# Patient Record
Sex: FEMALE | Race: WHITE | NOT HISPANIC OR LATINO | Employment: OTHER | URBAN - METROPOLITAN AREA
[De-identification: names, ages, dates, MRNs, and addresses within clinical notes are randomized per-mention and may not be internally consistent; named-entity substitution may affect disease eponyms.]

---

## 2021-06-14 NOTE — PROGRESS NOTES
[facility]   Consult  Deon Robins  1950    VISIT DATE:  06/15/21    PCP:   Nish Rodriguez MD  [address] [place] [address]        CC:  Atrial Fibrillation      Problem List:  1.  Atrial fibrillation  2.  HTN  3.  C 2006 normal per patient    History of Present Illness:  Deon Robins  Is a 71 y.o. female with pertinent cardiac history detailed above.  The patient was seen in the ER [facility] last week with RAKAN garcia with RVR. Symptoms were lightheadedness, sweaty, palpitations.   Responded to diltiazem IV.  She is in atrial fibrillation today.  Her home medications include sotalol 120 mg twice daily and Xarelto for anticoagulation  She had seen cardiology in January, planned to have an echo but was not done.  Last ECV was 3 years ago. she saw  [facility]  Dr. Dixon, Dr. Abraham.   She thinks she went back into afib last week.  She thinks she done well maintaining sinus rhythm since cardioversion 3 years ago.  She states she takes her Xarelto regularly.  She denies any chest pain or pressure outside of when she is in atrial fibrillation.  Her blood pressure is controlled.    There are no problems to display for this patient.      No Known Allergies    Social History     Socioeconomic History   • Marital status:      Spouse name: Not on file   • Number of children: Not on file   • Years of education: Not on file   • Highest education level: Not on file   Tobacco Use   • Smoking status: Never Smoker   • Smokeless tobacco: Never Used   Substance and Sexual Activity   • Alcohol use: Not Currently   • Drug use: Never   • Sexual activity: Defer       History reviewed. No pertinent family history.    Current Medications:    Current Outpatient Medications:   •  amLODIPine (NORVASC) 5 MG tablet, Take 5 mg by mouth Daily., Disp: , Rfl:   •  losartan (COZAAR) 25 MG tablet, Take 25 mg by mouth Daily., Disp: , Rfl:   •  multivitamin (MULTI-VITAMIN  "PO), Take 1 tablet by mouth Daily., Disp: , Rfl:   •  sotalol (BETAPACE) 120 MG tablet, Take 120 mg by mouth 2 (two) times a day., Disp: , Rfl:   •  VITAMIN D PO, Take 1 tablet by mouth 2 (two) times a day., Disp: , Rfl:   •  Xarelto 20 MG tablet, Take 20 mg by mouth Daily., Disp: , Rfl:      Review of Systems   Constitutional: Positive for diaphoresis and malaise/fatigue.   Cardiovascular: Positive for irregular heartbeat and palpitations. Negative for chest pain and syncope.   Neurological: Positive for light-headedness.       Vitals:    06/15/21 1029   BP: 122/66   BP Location: Left arm   Patient Position: Sitting   Pulse: 78   SpO2: 97%   Weight: 84.8 kg (187 lb)   Height: 171.5 cm (67.5\")       Physical Exam  Constitutional:       Appearance: Normal appearance.   Cardiovascular:      Rate and Rhythm: Tachycardia present. Rhythm irregular.      Pulses: Normal pulses.      Heart sounds: Normal heart sounds.   Pulmonary:      Effort: Pulmonary effort is normal.      Breath sounds: Normal breath sounds.   Abdominal:      Palpations: Abdomen is soft.   Musculoskeletal:      Right lower leg: No edema.      Left lower leg: No edema.   Neurological:      Mental Status: She is alert. Mental status is at baseline.   Psychiatric:         Mood and Affect: Mood normal.         Diagnostic Data:    ECG 12 Lead    Date/Time: 6/15/2021 10:48 AM  Performed by: Timbo Munguia MD  Authorized by: Timbo Munguia MD   Comparison: compared with previous ECG from 6/10/2021  Similar to previous ECG  Rhythm: atrial fibrillation  Rate: tachycardic  BPM: 104  QRS axis: left    Clinical impression: abnormal EKG  Clinical impression comment: .            No results found for: CHLPL, TRIG, HDL, LDLDIRECT  No results found for: GLUCOSE, BUN, CREATININE, NA, K, CL, CO2, CREATININE, BUN  No results found for: HGBA1C  No results found for: WBC, HGB, HCT, PLT    Assessment:   Diagnosis Plan   1. Atrial fibrillation, " unspecified type (CMS/HCC)  ECG 12 Lead    Adult Transthoracic Echo Complete W/ Cont if Necessary Per Protocol    Cardioversion External in Cardiology Department   2. Abnormal electrocardiogram (ECG) (EKG)   Adult Transthoracic Echo Complete W/ Cont if Necessary Per Protocol    Cardioversion External in Cardiology Department       Plan:      1.  Atrial fibrillation  -Reports she typically has done well maintaining sinus rhythm.  Last cardioversion was about 3 years ago  -schedule repeat ECV as she is having persistent symptomatic atrial fibrillation, although it is generally rate controlled  -reports compliance with xarelto  -We will obtain an echocardiogram to evaluate underlying LV systolic function.    2.  Hypertension  -on amlodipine, losartan, controlled    Follow-up at time of procedure.  Schedule follow-up in the office in [facility] in 3 months            Timbo Munguia MD [facility]

## 2021-06-14 NOTE — H&P (VIEW-ONLY)
[facility]   Consult  Deon Robins  1950    VISIT DATE:  06/15/21    PCP:   Nish Rodriguez MD  [address] [place] [address]        CC:  Atrial Fibrillation      Problem List:  1.  Atrial fibrillation  2.  HTN  3.  C 2006 normal per patient    History of Present Illness:  Deon Robins  Is a 71 y.o. female with pertinent cardiac history detailed above.  The patient was seen in the ER [facility] last week with RAKAN garcia with RVR. Symptoms were lightheadedness, sweaty, palpitations.   Responded to diltiazem IV.  She is in atrial fibrillation today.  Her home medications include sotalol 120 mg twice daily and Xarelto for anticoagulation  She had seen cardiology in January, planned to have an echo but was not done.  Last ECV was 3 years ago. she saw  [facility]  Dr. Dixon, Dr. Abraham.   She thinks she went back into afib last week.  She thinks she done well maintaining sinus rhythm since cardioversion 3 years ago.  She states she takes her Xarelto regularly.  She denies any chest pain or pressure outside of when she is in atrial fibrillation.  Her blood pressure is controlled.    There are no problems to display for this patient.      No Known Allergies    Social History     Socioeconomic History   • Marital status:      Spouse name: Not on file   • Number of children: Not on file   • Years of education: Not on file   • Highest education level: Not on file   Tobacco Use   • Smoking status: Never Smoker   • Smokeless tobacco: Never Used   Substance and Sexual Activity   • Alcohol use: Not Currently   • Drug use: Never   • Sexual activity: Defer       History reviewed. No pertinent family history.    Current Medications:    Current Outpatient Medications:   •  amLODIPine (NORVASC) 5 MG tablet, Take 5 mg by mouth Daily., Disp: , Rfl:   •  losartan (COZAAR) 25 MG tablet, Take 25 mg by mouth Daily., Disp: , Rfl:   •  multivitamin (MULTI-VITAMIN  "PO), Take 1 tablet by mouth Daily., Disp: , Rfl:   •  sotalol (BETAPACE) 120 MG tablet, Take 120 mg by mouth 2 (two) times a day., Disp: , Rfl:   •  VITAMIN D PO, Take 1 tablet by mouth 2 (two) times a day., Disp: , Rfl:   •  Xarelto 20 MG tablet, Take 20 mg by mouth Daily., Disp: , Rfl:      Review of Systems   Constitutional: Positive for diaphoresis and malaise/fatigue.   Cardiovascular: Positive for irregular heartbeat and palpitations. Negative for chest pain and syncope.   Neurological: Positive for light-headedness.       Vitals:    06/15/21 1029   BP: 122/66   BP Location: Left arm   Patient Position: Sitting   Pulse: 78   SpO2: 97%   Weight: 84.8 kg (187 lb)   Height: 171.5 cm (67.5\")       Physical Exam  Constitutional:       Appearance: Normal appearance.   Cardiovascular:      Rate and Rhythm: Tachycardia present. Rhythm irregular.      Pulses: Normal pulses.      Heart sounds: Normal heart sounds.   Pulmonary:      Effort: Pulmonary effort is normal.      Breath sounds: Normal breath sounds.   Abdominal:      Palpations: Abdomen is soft.   Musculoskeletal:      Right lower leg: No edema.      Left lower leg: No edema.   Neurological:      Mental Status: She is alert. Mental status is at baseline.   Psychiatric:         Mood and Affect: Mood normal.         Diagnostic Data:    ECG 12 Lead    Date/Time: 6/15/2021 10:48 AM  Performed by: Timbo Munguia MD  Authorized by: Timbo Munguia MD   Comparison: compared with previous ECG from 6/10/2021  Similar to previous ECG  Rhythm: atrial fibrillation  Rate: tachycardic  BPM: 104  QRS axis: left    Clinical impression: abnormal EKG  Clinical impression comment: .            No results found for: CHLPL, TRIG, HDL, LDLDIRECT  No results found for: GLUCOSE, BUN, CREATININE, NA, K, CL, CO2, CREATININE, BUN  No results found for: HGBA1C  No results found for: WBC, HGB, HCT, PLT    Assessment:   Diagnosis Plan   1. Atrial fibrillation, " unspecified type (CMS/HCC)  ECG 12 Lead    Adult Transthoracic Echo Complete W/ Cont if Necessary Per Protocol    Cardioversion External in Cardiology Department   2. Abnormal electrocardiogram (ECG) (EKG)   Adult Transthoracic Echo Complete W/ Cont if Necessary Per Protocol    Cardioversion External in Cardiology Department       Plan:      1.  Atrial fibrillation  -Reports she typically has done well maintaining sinus rhythm.  Last cardioversion was about 3 years ago  -schedule repeat ECV as she is having persistent symptomatic atrial fibrillation, although it is generally rate controlled  -reports compliance with xarelto  -We will obtain an echocardiogram to evaluate underlying LV systolic function.    2.  Hypertension  -on amlodipine, losartan, controlled    Follow-up at time of procedure.  Schedule follow-up in the office in [facility] in 3 months            Timbo Munguia MD [facility]

## 2021-06-15 ENCOUNTER — OFFICE VISIT (OUTPATIENT)
Dept: CARDIOLOGY | Facility: CLINIC | Age: 71
End: 2021-06-15

## 2021-06-15 VITALS
WEIGHT: 187 LBS | HEIGHT: 68 IN | HEART RATE: 78 BPM | BODY MASS INDEX: 28.34 KG/M2 | DIASTOLIC BLOOD PRESSURE: 66 MMHG | SYSTOLIC BLOOD PRESSURE: 122 MMHG | OXYGEN SATURATION: 97 %

## 2021-06-15 DIAGNOSIS — R94.31 ABNORMAL ELECTROCARDIOGRAM (ECG) (EKG): ICD-10-CM

## 2021-06-15 DIAGNOSIS — I48.91 ATRIAL FIBRILLATION, UNSPECIFIED TYPE (HCC): Primary | ICD-10-CM

## 2021-06-15 PROCEDURE — 93000 ELECTROCARDIOGRAM COMPLETE: CPT | Performed by: INTERNAL MEDICINE

## 2021-06-15 PROCEDURE — 99204 OFFICE O/P NEW MOD 45 MIN: CPT | Performed by: INTERNAL MEDICINE

## 2021-06-15 RX ORDER — RIVAROXABAN 20 MG/1
20 TABLET, FILM COATED ORAL DAILY
COMMUNITY
Start: 2021-04-21 | End: 2021-07-19 | Stop reason: SDUPTHER

## 2021-06-15 RX ORDER — DIPHENOXYLATE HYDROCHLORIDE AND ATROPINE SULFATE 2.5; .025 MG/1; MG/1
1 TABLET ORAL DAILY
COMMUNITY

## 2021-06-15 RX ORDER — LOSARTAN POTASSIUM 25 MG/1
25 TABLET ORAL DAILY
COMMUNITY
Start: 2021-04-13 | End: 2021-11-04 | Stop reason: SDUPTHER

## 2021-06-15 RX ORDER — AMLODIPINE BESYLATE 5 MG/1
5 TABLET ORAL DAILY
COMMUNITY
Start: 2021-06-07 | End: 2022-08-16 | Stop reason: SDUPTHER

## 2021-06-15 RX ORDER — SOTALOL HYDROCHLORIDE 120 MG/1
120 TABLET ORAL 2 TIMES DAILY
COMMUNITY
Start: 2021-05-17 | End: 2022-02-17 | Stop reason: SDUPTHER

## 2021-06-21 ENCOUNTER — HOSPITAL ENCOUNTER (OUTPATIENT)
Dept: CARDIOLOGY | Facility: HOSPITAL | Age: 71
Discharge: HOME | End: 2021-06-21
Attending: INTERNAL MEDICINE | Admitting: INTERNAL MEDICINE

## 2021-06-21 ENCOUNTER — PREP FOR SURGERY (OUTPATIENT)
Dept: OTHER | Facility: HOSPITAL | Age: 71
End: 2021-06-21

## 2021-06-21 ENCOUNTER — APPOINTMENT (OUTPATIENT)
Dept: CARDIOLOGY | Facility: HOSPITAL | Age: 71
End: 2021-06-21

## 2021-06-21 VITALS
OXYGEN SATURATION: 99 % | RESPIRATION RATE: 16 BRPM | BODY MASS INDEX: 29.35 KG/M2 | TEMPERATURE: 97.8 F | WEIGHT: 187 LBS | HEIGHT: 67 IN | DIASTOLIC BLOOD PRESSURE: 59 MMHG | HEART RATE: 49 BPM | SYSTOLIC BLOOD PRESSURE: 95 MMHG

## 2021-06-21 DIAGNOSIS — I48.19 ATRIAL FIBRILLATION, PERSISTENT (HCC): Primary | ICD-10-CM

## 2021-06-21 DIAGNOSIS — R94.31 ABNORMAL ELECTROCARDIOGRAM (ECG) (EKG): ICD-10-CM

## 2021-06-21 DIAGNOSIS — I48.91 ATRIAL FIBRILLATION, UNSPECIFIED TYPE (HCC): ICD-10-CM

## 2021-06-21 LAB
ANION GAP SERPL CALCULATED.3IONS-SCNC: 13 MMOL/L (ref 5–15)
BH CV ECHO MEAS - AO ROOT AREA (BSA CORRECTED): 1.9
BH CV ECHO MEAS - AO ROOT AREA: 10.5 CM^2
BH CV ECHO MEAS - AO ROOT DIAM: 3.7 CM
BH CV ECHO MEAS - BSA(HAYCOCK): 2 M^2
BH CV ECHO MEAS - BSA: 2 M^2
BH CV ECHO MEAS - BZI_BMI: 29.4 KILOGRAMS/M^2
BH CV ECHO MEAS - BZI_METRIC_HEIGHT: 170 CM
BH CV ECHO MEAS - BZI_METRIC_WEIGHT: 84.8 KG
BH CV ECHO MEAS - EDV(CUBED): 62.5 ML
BH CV ECHO MEAS - EDV(MOD-SP2): 71.5 ML
BH CV ECHO MEAS - EDV(MOD-SP4): 99.8 ML
BH CV ECHO MEAS - EDV(TEICH): 68.7 ML
BH CV ECHO MEAS - EF(CUBED): 68 %
BH CV ECHO MEAS - EF(MOD-SP2): 47.3 %
BH CV ECHO MEAS - EF(MOD-SP4): 42.5 %
BH CV ECHO MEAS - EF(TEICH): 60.1 %
BH CV ECHO MEAS - ESV(CUBED): 20 ML
BH CV ECHO MEAS - ESV(MOD-SP2): 37.7 ML
BH CV ECHO MEAS - ESV(MOD-SP4): 57.4 ML
BH CV ECHO MEAS - ESV(TEICH): 27.4 ML
BH CV ECHO MEAS - FS: 31.6 %
BH CV ECHO MEAS - IVS/LVPW: 0.94
BH CV ECHO MEAS - IVSD: 0.98 CM
BH CV ECHO MEAS - LA DIMENSION: 2.9 CM
BH CV ECHO MEAS - LA/AO: 0.8
BH CV ECHO MEAS - LAD MAJOR: 5.2 CM
BH CV ECHO MEAS - LAT PEAK E' VEL: 3.4 CM/SEC
BH CV ECHO MEAS - LATERAL E/E' RATIO: 24.9
BH CV ECHO MEAS - LV DIASTOLIC VOL/BSA (35-75): 50.8 ML/M^2
BH CV ECHO MEAS - LV MASS(C)D: 128.6 GRAMS
BH CV ECHO MEAS - LV MASS(C)DI: 65.5 GRAMS/M^2
BH CV ECHO MEAS - LV MAX PG: 2.1 MMHG
BH CV ECHO MEAS - LV MEAN PG: 0.92 MMHG
BH CV ECHO MEAS - LV SYSTOLIC VOL/BSA (12-30): 29.2 ML/M^2
BH CV ECHO MEAS - LV V1 MAX: 73.3 CM/SEC
BH CV ECHO MEAS - LV V1 MEAN: 42.8 CM/SEC
BH CV ECHO MEAS - LV V1 VTI: 15.5 CM
BH CV ECHO MEAS - LVIDD: 4 CM
BH CV ECHO MEAS - LVIDS: 2.7 CM
BH CV ECHO MEAS - LVLD AP2: 7.9 CM
BH CV ECHO MEAS - LVLD AP4: 7.4 CM
BH CV ECHO MEAS - LVLS AP2: 7 CM
BH CV ECHO MEAS - LVLS AP4: 6.8 CM
BH CV ECHO MEAS - LVOT AREA (M): 3.1 CM^2
BH CV ECHO MEAS - LVOT AREA: 3.2 CM^2
BH CV ECHO MEAS - LVOT DIAM: 2 CM
BH CV ECHO MEAS - LVPWD: 1.1 CM
BH CV ECHO MEAS - MED PEAK E' VEL: 8.2 CM/SEC
BH CV ECHO MEAS - MEDIAL E/E' RATIO: 10.3
BH CV ECHO MEAS - MV DEC SLOPE: 687.1 CM/SEC^2
BH CV ECHO MEAS - MV E MAX VEL: 84 CM/SEC
BH CV ECHO MEAS - MV MAX PG: 5.6 MMHG
BH CV ECHO MEAS - MV MEAN PG: 2.2 MMHG
BH CV ECHO MEAS - MV P1/2T MAX VEL: 76.2 CM/SEC
BH CV ECHO MEAS - MV P1/2T: 32.5 MSEC
BH CV ECHO MEAS - MV V2 MAX: 118.1 CM/SEC
BH CV ECHO MEAS - MV V2 MEAN: 69.5 CM/SEC
BH CV ECHO MEAS - MV V2 VTI: 17.4 CM
BH CV ECHO MEAS - MVA P1/2T LCG: 2.9 CM^2
BH CV ECHO MEAS - MVA(P1/2T): 6.8 CM^2
BH CV ECHO MEAS - MVA(VTI): 2.8 CM^2
BH CV ECHO MEAS - PA ACC TIME: 0.09 SEC
BH CV ECHO MEAS - PA PR(ACCEL): 37.4 MMHG
BH CV ECHO MEAS - SI(CUBED): 21.6 ML/M^2
BH CV ECHO MEAS - SI(LVOT): 25.1 ML/M^2
BH CV ECHO MEAS - SI(MOD-SP2): 17.2 ML/M^2
BH CV ECHO MEAS - SI(MOD-SP4): 21.6 ML/M^2
BH CV ECHO MEAS - SI(TEICH): 21 ML/M^2
BH CV ECHO MEAS - SV(CUBED): 42.4 ML
BH CV ECHO MEAS - SV(LVOT): 49.2 ML
BH CV ECHO MEAS - SV(MOD-SP2): 33.8 ML
BH CV ECHO MEAS - SV(MOD-SP4): 42.4 ML
BH CV ECHO MEAS - SV(TEICH): 41.3 ML
BH CV ECHO MEAS - TAPSE (>1.6): 1.5 CM
BH CV ECHO MEASUREMENTS AVERAGE E/E' RATIO: 14.48
BH CV VAS BP LEFT ARM: NORMAL MMHG
BH CV XLRA - RV BASE: 4.6 CM
BH CV XLRA - RV LENGTH: 5.6 CM
BH CV XLRA - RV MID: 3.4 CM
BH CV XLRA - TDI S': 12.9 CM/SEC
BUN SERPL-MCNC: 13 MG/DL (ref 8–23)
BUN/CREAT SERPL: 18.6 (ref 7–25)
CALCIUM SPEC-SCNC: 9.9 MG/DL (ref 8.6–10.5)
CHLORIDE SERPL-SCNC: 107 MMOL/L (ref 98–107)
CO2 SERPL-SCNC: 21 MMOL/L (ref 22–29)
CREAT SERPL-MCNC: 0.7 MG/DL (ref 0.57–1)
DEPRECATED RDW RBC AUTO: 43.5 FL (ref 37–54)
ERYTHROCYTE [DISTWIDTH] IN BLOOD BY AUTOMATED COUNT: 13.2 % (ref 12.3–15.4)
GFR SERPL CREATININE-BSD FRML MDRD: 83 ML/MIN/1.73
GLUCOSE SERPL-MCNC: 104 MG/DL (ref 65–99)
HCT VFR BLD AUTO: 42.8 % (ref 34–46.6)
HGB BLD-MCNC: 14.1 G/DL (ref 12–15.9)
LEFT ATRIUM VOLUME INDEX: 27.8 ML/M^2
LEFT ATRIUM VOLUME: 54.6 ML
LV EF 2D ECHO EST: 50 %
MAXIMAL PREDICTED HEART RATE: 150 BPM
MAXIMAL PREDICTED HEART RATE: 150 BPM
MCH RBC QN AUTO: 29.4 PG (ref 26.6–33)
MCHC RBC AUTO-ENTMCNC: 32.9 G/DL (ref 31.5–35.7)
MCV RBC AUTO: 89.2 FL (ref 79–97)
PLATELET # BLD AUTO: 272 10*3/MM3 (ref 140–450)
PMV BLD AUTO: 10.6 FL (ref 6–12)
POTASSIUM SERPL-SCNC: 3.7 MMOL/L (ref 3.5–5.2)
RBC # BLD AUTO: 4.8 10*6/MM3 (ref 3.77–5.28)
SODIUM SERPL-SCNC: 141 MMOL/L (ref 136–145)
STRESS TARGET HR: 128 BPM
STRESS TARGET HR: 128 BPM
WBC # BLD AUTO: 8.34 10*3/MM3 (ref 3.4–10.8)

## 2021-06-21 PROCEDURE — 93005 ELECTROCARDIOGRAM TRACING: CPT | Performed by: INTERNAL MEDICINE

## 2021-06-21 PROCEDURE — 80048 BASIC METABOLIC PNL TOTAL CA: CPT

## 2021-06-21 PROCEDURE — 93306 TTE W/DOPPLER COMPLETE: CPT

## 2021-06-21 PROCEDURE — 92960 CARDIOVERSION ELECTRIC EXT: CPT

## 2021-06-21 PROCEDURE — 93306 TTE W/DOPPLER COMPLETE: CPT | Performed by: INTERNAL MEDICINE

## 2021-06-21 PROCEDURE — 92960 CARDIOVERSION ELECTRIC EXT: CPT | Performed by: INTERNAL MEDICINE

## 2021-06-21 PROCEDURE — 85027 COMPLETE CBC AUTOMATED: CPT

## 2021-06-21 PROCEDURE — 36415 COLL VENOUS BLD VENIPUNCTURE: CPT

## 2021-06-21 PROCEDURE — 25010000002 MIDAZOLAM PER 1 MG: Performed by: INTERNAL MEDICINE

## 2021-06-21 RX ORDER — MIDAZOLAM HYDROCHLORIDE 1 MG/ML
INJECTION INTRAMUSCULAR; INTRAVENOUS
Status: DISCONTINUED
Start: 2021-06-21 | End: 2021-06-21 | Stop reason: HOSPADM

## 2021-06-21 RX ORDER — FLUMAZENIL 0.1 MG/ML
INJECTION INTRAVENOUS
Status: DISCONTINUED
Start: 2021-06-21 | End: 2021-06-21 | Stop reason: WASHOUT

## 2021-06-21 RX ORDER — NALOXONE HYDROCHLORIDE 0.4 MG/ML
INJECTION, SOLUTION INTRAMUSCULAR; INTRAVENOUS; SUBCUTANEOUS
Status: DISCONTINUED
Start: 2021-06-21 | End: 2021-06-21 | Stop reason: WASHOUT

## 2021-06-21 RX ORDER — MIDAZOLAM HYDROCHLORIDE 1 MG/ML
INJECTION INTRAMUSCULAR; INTRAVENOUS
Status: COMPLETED | OUTPATIENT
Start: 2021-06-21 | End: 2021-06-21

## 2021-06-21 RX ADMIN — METHOHEXITAL SODIUM 30 MG: 500 INJECTION, POWDER, LYOPHILIZED, FOR SOLUTION INTRAMUSCULAR; INTRAVENOUS; RECTAL at 13:36

## 2021-06-21 RX ADMIN — MIDAZOLAM 2 MG: 1 INJECTION INTRAMUSCULAR; INTRAVENOUS at 13:36

## 2021-06-21 NOTE — INTERVAL H&P NOTE
The patient presents for cardioversion as detailed above.  She has been continuously compliant with Xarelto.  She has no additional complaints or concerns today.  She acknowledges the risks and benefits of the procedure and is agreeable to proceed.

## 2021-06-21 NOTE — PROCEDURES
Diagnosis:  Atrial fibrillation    PROCEDURE PERFORMED: External electrical cardioversion.    Anesthesia: Sedation with:  1. 2 mg Versed  2. 30 mg Brevital    Estimated Blood Loss: Less than 10 mL     Specimens: None    PROCEDURE IN DETAIL: The patient was brought into the [facility] in a fasting  state. The patient was given moderate sedation during which she received 200  joules of external electrical cardioversion that converted atrial  fibrillation to normal sinus heart rhythm.  The patient has been continuously anticoagulated on Xarelto    IMPRESSION: Successful conversion of atrial fibrillation to normal sinus  heart rhythm.

## 2021-06-29 LAB
QT INTERVAL: 458 MS
QTC INTERVAL: 472 MS

## 2021-06-29 PROCEDURE — 93010 ELECTROCARDIOGRAM REPORT: CPT | Performed by: INTERNAL MEDICINE

## 2021-07-19 NOTE — TELEPHONE ENCOUNTER
Medication Refill Request    Medication: Xarelto 20 mg daily    Pertinent Labs:  Lab Results   Component Value Date    GLUCOSE 104 (H) 06/21/2021    BUN 13 06/21/2021    CREATININE 0.70 06/21/2021    EGFRIFNONA 83 06/21/2021    BCR 18.6 06/21/2021    K 3.7 06/21/2021    CO2 21.0 (L) 06/21/2021    CALCIUM 9.9 06/21/2021      No results found for: CHOL, CHLPL, TRIG, HDL, LDL, LDLDIRECT  No results found for: HGBA1C  Lab Results   Component Value Date    WBC 8.34 06/21/2021    HGB 14.1 06/21/2021    HCT 42.8 06/21/2021    MCV 89.2 06/21/2021     06/21/2021     No results found for: TSH

## 2021-11-03 NOTE — PROGRESS NOTES
[facility]  Follow Up Visit  Deon Robins  1950    VISIT DATE:  11/04/21    PCP:   Nish Rodriguez MD  [address] [place] [address]          CC:  Atrial fibrillation, unspecified type (CMS/[facility]      Problem List:  1.  Atrial fibrillation  2.  HTN  3.  LHC 2006 normal per patient  4.  Echo June 2021: EF 50%, grade 1 diastolic dysfunction, mild to moderate RV dilation with normal function, mild MR       History of Present Illness:  Deon Robins  Is a 71 y.o. female with pertinent cardiac history detailed above.  Patient underwent a cardioversion to sinus rhythm in June.  Is maintaining sinus rhythm today on sotalol.  QTC is within normal limits.  Denies any new complaints.  Has not felt any significant recurrence of A. fib did question about sleep apnea and she states she does get fatigued in the day and reports she just been told she snores.  No prior evaluation for sleep apnea.      There are no problems to display for this patient.      Allergies   Allergen Reactions   • Penicillins Rash       Social History     Socioeconomic History   • Marital status:    Tobacco Use   • Smoking status: Never Smoker   • Smokeless tobacco: Never Used   Substance and Sexual Activity   • Alcohol use: Not Currently   • Drug use: Never   • Sexual activity: Defer       History reviewed. No pertinent family history.    Current Medications:    Current Outpatient Medications:   •  amLODIPine (NORVASC) 5 MG tablet, Take 5 mg by mouth Daily., Disp: , Rfl:   •  losartan (COZAAR) 25 MG tablet, Take 1 tablet by mouth 2 (Two) Times a Day., Disp: 60 tablet, Rfl: 6  •  multivitamin (MULTI-VITAMIN PO), Take 1 tablet by mouth Daily., Disp: , Rfl:   •  rivaroxaban (Xarelto) 20 MG tablet, Take 1 tablet by mouth Daily., Disp: 90 tablet, Rfl: 1  •  sotalol (BETAPACE) 120 MG tablet, Take 120 mg by mouth 2 (two) times a day., Disp: , Rfl:   •  VITAMIN D PO, Take 1 tablet by mouth 2 (two) times a day.,  "Disp: , Rfl:      Review of Systems   Constitutional: Positive for malaise/fatigue.   Cardiovascular: Positive for leg swelling and palpitations. Negative for chest pain and irregular heartbeat.   Respiratory: Positive for sleep disturbances due to breathing.        Vitals:    11/04/21 1310   BP: 158/76   BP Location: Right arm   Patient Position: Sitting   Pulse: 54   SpO2: 95%   Weight: 87.3 kg (192 lb 6.4 oz)   Height: 170.2 cm (67\")       Physical Exam  Constitutional:       Appearance: Normal appearance.   Neck:      Vascular: No carotid bruit.   Cardiovascular:      Rate and Rhythm: Normal rate and regular rhythm.      Pulses: Normal pulses.      Heart sounds: Normal heart sounds.   Pulmonary:      Breath sounds: Normal breath sounds.   Musculoskeletal:      Right lower leg: No edema.      Left lower leg: No edema.   Skin:     General: Skin is warm and dry.   Neurological:      Mental Status: She is alert.         Diagnostic Data:    ECG 12 Lead    Date/Time: 11/4/2021 1:26 PM  Performed by: Timbo Munguia MD  Authorized by: Timbo Munguia MD   Comparison: compared with previous ECG from 6/21/2021  Similar to previous ECG  Rhythm: sinus bradycardia  Rate: bradycardic  BPM: 54  QRS axis: left  Other findings: low voltage    Clinical impression: abnormal EKG  Clinical impression comment: QTC WNL            No results found for: CHLPL, TRIG, HDL, LDLDIRECT  Lab Results   Component Value Date    GLUCOSE 104 (H) 06/21/2021    BUN 13 06/21/2021    CREATININE 0.70 06/21/2021     06/21/2021    K 3.7 06/21/2021     06/21/2021    CO2 21.0 (L) 06/21/2021     No results found for: HGBA1C  Lab Results   Component Value Date    WBC 8.34 06/21/2021    HGB 14.1 06/21/2021    HCT 42.8 06/21/2021     06/21/2021       Assessment:  No diagnosis found.    Plan:    1.  Atrial fibrillation  -Cardioversion to sinus June 2021  -Continue sotalol and Xarelto, QTC within normal limits  -EF 50%.  There " was RV dilation     2.  Hypertension  -on amlodipine, losartan,   -Elevated in office today, increase losartan to 25 mg twice daily    3.  Screen for sleep apnea secondary to RV dilation and presence of atrial fibrillation.  She has been referred to sleep medicine for ALE alexis Munguia MD [facility]

## 2021-11-04 ENCOUNTER — OFFICE VISIT (OUTPATIENT)
Dept: CARDIOLOGY | Facility: CLINIC | Age: 71
End: 2021-11-04

## 2021-11-04 VITALS
HEART RATE: 54 BPM | HEIGHT: 67 IN | SYSTOLIC BLOOD PRESSURE: 158 MMHG | WEIGHT: 192.4 LBS | OXYGEN SATURATION: 95 % | DIASTOLIC BLOOD PRESSURE: 76 MMHG | BODY MASS INDEX: 30.2 KG/M2

## 2021-11-04 DIAGNOSIS — I48.91 ATRIAL FIBRILLATION, UNSPECIFIED TYPE (HCC): Primary | ICD-10-CM

## 2021-11-04 DIAGNOSIS — G47.30 SLEEP APNEA, UNSPECIFIED TYPE: ICD-10-CM

## 2021-11-04 PROCEDURE — 99214 OFFICE O/P EST MOD 30 MIN: CPT | Performed by: INTERNAL MEDICINE

## 2021-11-04 PROCEDURE — 93000 ELECTROCARDIOGRAM COMPLETE: CPT | Performed by: INTERNAL MEDICINE

## 2021-11-04 RX ORDER — LOSARTAN POTASSIUM 25 MG/1
25 TABLET ORAL 2 TIMES DAILY
Qty: 60 TABLET | Refills: 6 | Status: SHIPPED | OUTPATIENT
Start: 2021-11-04 | End: 2022-08-16 | Stop reason: SDUPTHER

## 2022-02-17 RX ORDER — SOTALOL HYDROCHLORIDE 120 MG/1
120 TABLET ORAL 2 TIMES DAILY
Qty: 180 TABLET | Refills: 1 | Status: SHIPPED | OUTPATIENT
Start: 2022-02-17 | End: 2022-08-16 | Stop reason: SDUPTHER

## 2022-03-07 ENCOUNTER — OFFICE VISIT (OUTPATIENT)
Dept: SLEEP MEDICINE | Facility: CLINIC | Age: 72
End: 2022-03-07

## 2022-03-07 VITALS
HEART RATE: 54 BPM | SYSTOLIC BLOOD PRESSURE: 162 MMHG | BODY MASS INDEX: 30.13 KG/M2 | DIASTOLIC BLOOD PRESSURE: 81 MMHG | OXYGEN SATURATION: 96 % | WEIGHT: 192 LBS | HEIGHT: 67 IN

## 2022-03-07 DIAGNOSIS — E66.09 CLASS 1 OBESITY DUE TO EXCESS CALORIES WITHOUT SERIOUS COMORBIDITY WITH BODY MASS INDEX (BMI) OF 30.0 TO 30.9 IN ADULT: ICD-10-CM

## 2022-03-07 DIAGNOSIS — R06.83 SNORING: Primary | ICD-10-CM

## 2022-03-07 DIAGNOSIS — G47.33 OBSTRUCTIVE SLEEP APNEA, ADULT: ICD-10-CM

## 2022-03-07 PROCEDURE — 99203 OFFICE O/P NEW LOW 30 MIN: CPT | Performed by: INTERNAL MEDICINE

## 2022-03-07 NOTE — EXTERNAL PATIENT INSTRUCTIONS
Patient Education   Table of Contents       Sleep Apnea     To view videos and all your education online visit,   [email or web address]   or scan this QR code with your smartphone.                  Sleep Apnea     Sleep apnea is a condition in which breathing pauses or becomes shallow during sleep. Episodes of sleep apnea usually last 10 seconds or longer, and they may occur as many as 20 times an hour. Sleep apnea disrupts your sleep and keeps your body from getting the rest that it needs. This condition can increase your risk of certain health problems, including:       Heart attack.       Stroke.       Obesity.       Diabetes.       Heart failure.       Irregular heartbeat.     What are the causes?       There are three kinds of sleep apnea:       Obstructive sleep apnea. This kind is caused by a blocked or collapsed airway.       Central sleep apnea. This kind happens when the part of the brain that controls breathing does not send the correct signals to the muscles that control breathing.       Mixed sleep apnea. This is a combination of obstructive and central sleep apnea.      The most common cause of this condition is a collapsed or blocked airway. An airway can collapse or become blocked if:       Your throat muscles are abnormally relaxed.       Your tongue and tonsils are larger than normal.       You are overweight.       Your airway is smaller than normal.       What increases the risk?    You are more likely to develop this condition if you:       Are overweight.       Smoke.       Have a smaller than normal airway.       Are elderly.       Are male.       Drink alcohol.       Take sedatives or tranquilizers.       Have a family history of sleep apnea.     What are the signs or symptoms?    Symptoms of this condition include:       Trouble staying asleep.       Daytime sleepiness and tiredness.       Irritability.       Loud snoring.       Morning headaches.       Trouble concentrating.        Forgetfulness.       Decreased interest in sex.       Unexplained sleepiness.       Mood swings.       Personality changes.       Feelings of depression.       Waking up often during the night to urinate.       Dry mouth.       Sore throat.     How is this diagnosed?    This condition may be diagnosed with:       A medical history.       A physical exam.       A series of tests that are done while you are sleeping (sleep study). These tests are usually done in a sleep lab, but they may also be done at home.     How is this treated?       Treatment for this condition aims to restore normal breathing and to ease symptoms during sleep. It may involve managing health issues that can affect breathing, such as high blood pressure or obesity. Treatment may include:       Sleeping on your side.       Using a decongestant if you have nasal congestion.       Avoiding the use of depressants, including alcohol, sedatives, and narcotics.       Losing weight if you are overweight.       Making changes to your diet.       Quitting smoking.      Using a device to open your airway while you sleep, such as:       An oral appliance. This is a custom-made mouthpiece that shifts your lower jaw forward.       A continuous positive airway pressure (CPAP) device. This device blows air through a mask when you breathe out (exhale).       A nasal expiratory positive airway pressure (EPAP) device. This device has valves that you put into each nostril.       A bi-level positive airway pressure (BPAP) device. This device blows air through a mask when you breathe in (inhale) and breathe out (exhale).       Having surgery if other treatments do not work. During surgery, excess tissue is removed to create a wider airway.      It is important to get treatment for sleep apnea. Without treatment, this condition can lead to:       High blood pressure.       Coronary artery disease.       In men, an inability to achieve or maintain an erection  (impotence).       Reduced thinking abilities.       Follow these instructions at home:   Lifestyle         Make any lifestyle changes that your health care provider recommends.       Eat a healthy, well-balanced diet.       Take steps to lose weight if you are overweight.       Avoid using depressants, including alcohol, sedatives, and narcotics.      Do not  use any products that contain nicotine or tobacco, such as cigarettes, e-cigarettes, and chewing tobacco. If you need help quitting, ask your health care provider.     General instructions         Take over-the-counter and prescription medicines only as told by your health care provider.       If you were given a device to open your airway while you sleep, use it only as told by your health care provider.       If you are having surgery, make sure to tell your health care provider you have sleep apnea. You may need to bring your device with you.       Keep all follow-up visits as told by your health care provider. This is important.     Contact a health care provider if:         The device that you received to open your airway during sleep is uncomfortable or does not seem to be working.       Your symptoms do not improve.       Your symptoms get worse.     Get help right away if:        You develop:       Chest pain.       Shortness of breath.       Discomfort in your back, arms, or stomach.      You have:       Trouble speaking.       Weakness on one side of your body.       Drooping in your face.     These symptoms may represent a serious problem that is an emergency. Do not wait to see if the symptoms will go away. Get medical help right away. Call your local emergency services (911 in the [place] Do not drive yourself to the hospital.   Summary         Sleep apnea is a condition in which breathing pauses or becomes shallow during sleep.       The most common cause is a collapsed or blocked airway.       The goal of treatment is to restore normal breathing  and to ease symptoms during sleep.     This information is not intended to replace advice given to you by your health care provider. Make sure you discuss any questions you have with your health care provider.     Document Released: 12/08/2003Document Revised: 06/03/2020Document Reviewed: 08/13/2019     [employer] Patient Education ? 2021 [employer]

## 2022-03-20 NOTE — PROGRESS NOTES
Chief Complaint   snoring and possible sleep disordered breathing.    Subjective         Deon Robins presents to [facility] SLEEP MEDICINE for the evaluation of snoring and possible sleep disordered brain.  She is referred by Dr. Munguia.  Her primary care physician is Dr. Rodriguez.  She is seen in person in the sleep clinic.  History of Present Illness  Patient had a history of afibrillation since 2006.  She has required cardioversion twice.  She is referred for the evaluation of possible sleep disordered breathing as a contributing factor.  She says she does have snoring and she is awakened herself snoring.  She has had snoring noted for at least 10 years.  She denies noted apneas.  She denies awakening gasping for breath.  She says she is usually rested in the morning.  She denies any morning headaches.  She has awakened at night with A. fib previously.  She says she has lost her 7 pounds recently    She will awaken with a dry mouth.  She denies ever breaking her nose.  She denies having trouble breathing through her nose.  She occasionally naps during the day.  She denies having problems getting sleepy while driving.  She denies kicking or jerking her legs at night.  She does have chronic neck pain that may bother her at night.    She goes to bed between 9 PM and 10 PM.  She will fall asleep in 30 minutes.  She awakens 3-4 times during the night.  She thinks she gets 6 to 7 hours of sleep and usually feels rested.  She naps 15 to 20 minutes/day.  She has had hypertension none for 16 years.  She has had afibrillation none since 2006.  She denies any history of diabetes.    Clinical history:    Allergies: Penicillin and BuSpar    : Smoking: Without    Ethanol: Without    Caffeine: She has decaf tea 2-3 servings per day and decaf cola 2-3 drinks per day    Medical illnesses: Atrial fibrillation, hypertension, arthritis.    Medications:  amLODIPine (NORVASC) 5 MG tablet    losartan (COZAAR) 25 MG  "tablet    multivitamin (THERAGRAN) tablet tablet    rivaroxaban (Xarelto) 20 MG tablet    sotalol (BETAPACE) 120 MG tablet    VITAMIN D PO      Surgeries: Had a hysterectomy and bilateral knee replacements    Family history: Positives include diabetes, hypertension, stroke emphysema, cancer.    Review of systems: Positives include neck pain, neck stiffness.  Other systems reviewed negative.  Objective   Vital Signs:   /81 (BP Location: Left arm, Patient Position: Sitting, Cuff Size: Adult)   Pulse 54   Ht 170.2 cm (67\")   Wt 87.1 kg (192 lb)   SpO2 96%   BMI 30.07 kg/m²     Physical Exam patient appears to be awake and alert.  She does not appear to be in acute respiratory distress.    She is normocephalic she has Mallampati class III anatomy.    Lungs are clear.  Cardiac exam revealed normal S1 and S2.    Extremities no edema.  Result Review :         Assessment and Plan   Diagnoses and all orders for this visit:    1. Snoring (Primary)  -     Home Sleep Study; Future    2. Obstructive sleep apnea, adult  -     Home Sleep Study; Future    3. Class 1 obesity due to excess calories without serious comorbidity with body mass index (BMI) of 30.0 to 30.9 in adult    Patient has a history of snoring and occasionally nonrestorative sleep.  She has a history of atrial fibrillation.  She has an excellent story for obstructive apnea.  We will plan to proceed to sleep evaluation.  She prefers to do a home sleep test.  We have discussed possible therapies including CPAP, weight control, oral appliance, and surgery.  We have discussed the long-term consequences of untreated obstructive sleep apnea.  These include hypertension, diabetes, heart disease, stroke and dementia.  She is encouraged to lose weight.  Encouraged avoid alcohol and sedatives close to bedtime.  She is encouraged to practice lateral position sleep.  We will see her back after her study.  I spent 35 minutes caring for Deon on this date of service. " This time includes time spent by me in the following activities:obtaining and/or reviewing a separately obtained history, performing a medically appropriate examination and/or evaluation , counseling and educating the patient/family/caregiver, ordering medications, tests, or procedures and documenting information in the medical record  Follow Up   Return for Follow up after study, Next scheduled follow-up.  Patient was given instructions and counseling regarding her condition or for health maintenance advice. Please see specific information pulled into the AVS if appropriate.   Elliott Abel MD [facility]  Sleep Medicine  Pulmonary and Critical Care Medicine

## 2022-03-30 ENCOUNTER — HOSPITAL ENCOUNTER (OUTPATIENT)
Dept: SLEEP MEDICINE | Facility: HOSPITAL | Age: 72
Discharge: HOME | End: 2022-03-30
Admitting: INTERNAL MEDICINE

## 2022-03-30 DIAGNOSIS — R06.83 SNORING: ICD-10-CM

## 2022-03-30 DIAGNOSIS — G47.33 OBSTRUCTIVE SLEEP APNEA, ADULT: ICD-10-CM

## 2022-03-30 PROCEDURE — 95806 SLEEP STUDY UNATT&RESP EFFT: CPT | Performed by: INTERNAL MEDICINE

## 2022-03-30 PROCEDURE — 95806 SLEEP STUDY UNATT&RESP EFFT: CPT

## 2022-04-25 ENCOUNTER — OFFICE VISIT (OUTPATIENT)
Dept: SLEEP MEDICINE | Facility: CLINIC | Age: 72
End: 2022-04-25

## 2022-04-25 VITALS
HEART RATE: 59 BPM | WEIGHT: 198.4 LBS | HEIGHT: 67 IN | OXYGEN SATURATION: 97 % | BODY MASS INDEX: 31.14 KG/M2 | DIASTOLIC BLOOD PRESSURE: 79 MMHG | SYSTOLIC BLOOD PRESSURE: 153 MMHG

## 2022-04-25 DIAGNOSIS — G47.33 OBSTRUCTIVE SLEEP APNEA, ADULT: Primary | ICD-10-CM

## 2022-04-25 DIAGNOSIS — G47.34 NOCTURNAL HYPOXEMIA: ICD-10-CM

## 2022-04-25 DIAGNOSIS — E66.09 CLASS 1 OBESITY DUE TO EXCESS CALORIES WITHOUT SERIOUS COMORBIDITY WITH BODY MASS INDEX (BMI) OF 31.0 TO 31.9 IN ADULT: ICD-10-CM

## 2022-04-25 PROCEDURE — 99213 OFFICE O/P EST LOW 20 MIN: CPT | Performed by: INTERNAL MEDICINE

## 2022-04-25 RX ORDER — TRAMADOL HYDROCHLORIDE 50 MG/1
TABLET ORAL
COMMUNITY
Start: 2022-03-22

## 2022-05-11 NOTE — PROGRESS NOTES
[facility] Cardiology  Follow Up Visit  Deon Robins  1950    VISIT DATE:  05/12/22    PCP:   Nish Rodriguez MD  [address] [place] [address]          CC:  Atrial Fibrillation      Problem List:  1.  Atrial fibrillation  2.  HTN  3.  LHC 2006 normal per patient  4.  Echo June 2021: EF 50%, grade 1 diastolic dysfunction, mild to moderate RV dilation with normal function, mild MR    History of Present Illness:  Deon Robins  Is a 72 y.o. female with pertinent cardiac history detailed above.  Patient doing well since last visit.  She continues to maintain sinus rhythm.  No bleeding difficulties on Eliquis.  EKG today shows normal QTC.  She was tested for sleep apnea and is recommended CPAP.  She has not picked it up yet.  Blood pressure is well controlled after medication adjustments last visit.  No other concerns today.      There are no problems to display for this patient.      Allergies   Allergen Reactions   • Niaspan [Niacin] Unknown - Low Severity   • Penicillins Rash       Social History     Socioeconomic History   • Marital status:    Tobacco Use   • Smoking status: Never Smoker   • Smokeless tobacco: Never Used   Vaping Use   • Vaping Use: Never used   Substance and Sexual Activity   • Alcohol use: Not Currently   • Drug use: Never   • Sexual activity: Defer       History reviewed. No pertinent family history.    Current Medications:    Current Outpatient Medications:   •  amLODIPine (NORVASC) 5 MG tablet, Take 5 mg by mouth Daily., Disp: , Rfl:   •  losartan (COZAAR) 25 MG tablet, Take 1 tablet by mouth 2 (Two) Times a Day., Disp: 60 tablet, Rfl: 6  •  methocarbamol (ROBAXIN) 500 MG tablet, Take 500 mg by mouth As Needed., Disp: , Rfl:   •  multivitamin (THERAGRAN) tablet tablet, Take 1 tablet by mouth Daily., Disp: , Rfl:   •  rivaroxaban (Xarelto) 20 MG tablet, Take 1 tablet by mouth Daily., Disp: 90 tablet, Rfl: 3  •  sotalol (BETAPACE) 120 MG tablet, Take 1  "tablet by mouth 2 (Two) Times a Day., Disp: 180 tablet, Rfl: 1  •  traMADol (ULTRAM) 50 MG tablet, TAKE ONE-HALF TO ONE TAB EVERY FOUR TO SIX HOURS AS NEEDED PAIN, Disp: , Rfl:   •  VITAMIN D PO, Take 1 tablet by mouth 2 (two) times a day., Disp: , Rfl:      Review of Systems   Cardiovascular: Negative for chest pain.   All other systems reviewed and are negative.      Vitals:    05/12/22 1146   BP: 129/76   BP Location: Left arm   Patient Position: Sitting   Pulse: 67   SpO2: 97%   Weight: 88.9 kg (196 lb)   Height: 172.7 cm (68\")       Physical Exam  Constitutional:       Appearance: Normal appearance.   Cardiovascular:      Rate and Rhythm: Bradycardia present.      Pulses: Normal pulses.      Heart sounds: Normal heart sounds.      Comments: Occasional extrasystole  Pulmonary:      Effort: Pulmonary effort is normal.      Breath sounds: Normal breath sounds.   Skin:     General: Skin is warm and dry.   Neurological:      General: No focal deficit present.      Mental Status: She is alert.         Diagnostic Data:    ECG 12 Lead    Date/Time: 5/12/2022 12:02 PM  Performed by: Timbo Munguia MD  Authorized by: Timbo Munguia MD   Comparison: compared with previous ECG from 11/4/2021  Rhythm: sinus bradycardia  Rate: bradycardic  BPM: 56  QRS axis: normal  Other findings: poor R wave progression    Clinical impression: non-specific ECG  Clinical impression comment: Normal QTC.            No results found for: CHLPL, TRIG, HDL, LDLDIRECT  Lab Results   Component Value Date    GLUCOSE 104 (H) 06/21/2021    BUN 13 06/21/2021    CREATININE 0.70 06/21/2021     06/21/2021    K 3.7 06/21/2021     06/21/2021    CO2 21.0 (L) 06/21/2021     No results found for: HGBA1C  Lab Results   Component Value Date    WBC 8.34 06/21/2021    HGB 14.1 06/21/2021    HCT 42.8 06/21/2021     06/21/2021       Assessment:  No diagnosis found.    Plan:      1.  Atrial fibrillation  -Cardioversion to sinus " June 2021  -Continue sotalol and Xarelto, QTC within normal limits, maintaining sinus rhythm  -EF 50%.  There was RV dilation     2.  Hypertension  -on amlodipine, losartan,   -controlled in office.  No changes made     3.   ALE:  She has RV dilation and presence of atrial fibrillation.    CPAP recommended, she is working on getting it    Follow-up in 8 months    Timbo Munguia MD [facility]

## 2022-05-12 ENCOUNTER — OFFICE VISIT (OUTPATIENT)
Dept: CARDIOLOGY | Facility: CLINIC | Age: 72
End: 2022-05-12

## 2022-05-12 VITALS
SYSTOLIC BLOOD PRESSURE: 129 MMHG | HEIGHT: 68 IN | WEIGHT: 196 LBS | BODY MASS INDEX: 29.7 KG/M2 | OXYGEN SATURATION: 97 % | DIASTOLIC BLOOD PRESSURE: 76 MMHG | HEART RATE: 67 BPM

## 2022-05-12 DIAGNOSIS — I48.91 ATRIAL FIBRILLATION, UNSPECIFIED TYPE: Primary | ICD-10-CM

## 2022-05-12 PROCEDURE — 93000 ELECTROCARDIOGRAM COMPLETE: CPT | Performed by: INTERNAL MEDICINE

## 2022-05-12 PROCEDURE — 99214 OFFICE O/P EST MOD 30 MIN: CPT | Performed by: INTERNAL MEDICINE

## 2022-05-12 RX ORDER — METHOCARBAMOL 500 MG/1
500 TABLET, FILM COATED ORAL AS NEEDED
COMMUNITY
Start: 2022-04-29

## 2022-05-13 ENCOUNTER — TELEPHONE (OUTPATIENT)
Dept: SLEEP MEDICINE | Age: 72
End: 2022-05-13

## 2022-05-13 NOTE — PROGRESS NOTES
"Chief Complaint  Snoring nonrestorative sleep    Subjective         Deon Robins presents to [facility] SLEEP MEDICINE for the evaluation of snoring and nonrestorative sleep.  Her primary care physician is Dr. Bwoen.  She is referred by Dr. Munguia.  She is seen in person  in the sleep clinic.  History of Present Illness  Patient was last seen in clinic March 7.  She has a history of snoring nonrestorative sleep.  He has a history afibrillation, hypertension, and obesity.  She says she feels like she is been sleeping okay.  She has occasional snoring noted.  She does admit she awakens several times at night.  She denies any real changes in her health status.  She says she thought her study night was a fairly usual night.    Review of systems: Positives include neck pain, neck stiffness.  Other systems reviewed negative.     [place] score 0/24  Objective   Vital Signs:  /79 (BP Location: Left arm, Patient Position: Sitting, Cuff Size: Adult)   Pulse 59   Ht 170.2 cm (67\")   Wt 90 kg (198 lb 6.4 oz)   SpO2 97%   BMI 31.07 kg/m²           Physical Exam patient appears to be awake and alert.  She does not appear to be in acute respiratory distress.    She is normocephalic.    Lungs are clear.    Cardiac exam revealed normal S1-S2.    Extremities showed no edema.  Result Review :    Home sleep testing on March 30 showed an AHI of 21.1.  This is consistent with moderate obstructive sleep apnea.  She had little supine sleep on the night of her study.  She spent 135.7 minutes in the desaturated state.     Assessment and Plan  Diagnoses and all orders for this visit:    1. Obstructive sleep apnea, adult (Primary)  -      Mandibular Advancement Device (custom fabricated)  -     Ambulatory Referral to Dentistry    2. Nocturnal hypoxemia    3. Class 1 obesity due to excess calories without serious comorbidity with body mass index (BMI) of 31.0 to 31.9 in adult    Patient does have moderate " obstructive sleep apnea and significant nocturnal hypoxemia.  We have discussed potential therapies including CPAP, weight control, oral appliance, and surgery.  We have discussed the long-term consequences of untreated obstructive sleep apnea.  These include hypertension, diabetes, heart disease, stroke, and dementia.  After discussion she wishes to consider an oral appliance.  We will place an order for that and refer her to her dentist Dr. Craig.  We will plan to see her back in clinic in roughly 6 months.  She is to contact us earlier symptoms worsen.  She is encouraged to achieve ideal body weight.  She is encouraged avoid alcohol or sedatives close to bedtime.  She is encouraged to practice lateral position sleep.       I spent 25 minutes caring for Deon on this date of service. This time includes time spent by me in the following activities:reviewing tests, obtaining and/or reviewing a separately obtained history, performing a medically appropriate examination and/or evaluation , counseling and educating the patient/family/caregiver, ordering medications, tests, or procedures and documenting information in the medical record  Follow Up   Return in about 6 months (around 10/25/2022) for Next scheduled follow-up.  Patient was given instructions and counseling regarding her condition or for health maintenance advice. Please see specific information pulled into the AVS if appropriate.   Elliott Abel MD [facility]  Sleep Medicine  Pulmonary and Critical Care Medicine

## 2022-05-13 NOTE — TELEPHONE ENCOUNTER
Caller: JUANITA ESPINOZA     Relationship: SELF     Date of last appointment: 4/25/22    Issues/Supplies requested: PT IS CURRENTLY USING MANDIBULAR (MOUTH DEVICE) AND WANTS TO CHANGE OVER TO CI-PAP     Ordering physician's name: DR. EDIN BRIGGS    PT WOULD LIKE A CALLBACK TO DISCUSS CHANGING DEVICES. CALLBACK NUMBER [phone].

## 2022-08-16 RX ORDER — LOSARTAN POTASSIUM 25 MG/1
25 TABLET ORAL 2 TIMES DAILY
Qty: 180 TABLET | Refills: 3 | Status: SHIPPED | OUTPATIENT
Start: 2022-08-16

## 2022-08-16 RX ORDER — AMLODIPINE BESYLATE 5 MG/1
5 TABLET ORAL DAILY
Qty: 90 TABLET | Refills: 3 | Status: SHIPPED | OUTPATIENT
Start: 2022-08-16

## 2022-08-16 RX ORDER — SOTALOL HYDROCHLORIDE 120 MG/1
120 TABLET ORAL 2 TIMES DAILY
Qty: 180 TABLET | Refills: 3 | Status: SHIPPED | OUTPATIENT
Start: 2022-08-16

## 2023-01-19 ENCOUNTER — OFFICE VISIT (OUTPATIENT)
Dept: CARDIOLOGY | Facility: CLINIC | Age: 73
End: 2023-01-19
Payer: MEDICARE

## 2023-01-19 VITALS
OXYGEN SATURATION: 98 % | HEART RATE: 55 BPM | SYSTOLIC BLOOD PRESSURE: 138 MMHG | DIASTOLIC BLOOD PRESSURE: 72 MMHG | BODY MASS INDEX: 29.4 KG/M2 | HEIGHT: 68 IN | WEIGHT: 194 LBS

## 2023-01-19 DIAGNOSIS — I48.0 PAROXYSMAL ATRIAL FIBRILLATION: Primary | ICD-10-CM

## 2023-01-19 PROBLEM — I34.0 MILD MITRAL REGURGITATION: Status: ACTIVE | Noted: 2019-07-11

## 2023-01-19 PROBLEM — R00.1 SINUS BRADYCARDIA: Status: ACTIVE | Noted: 2021-07-22

## 2023-01-19 PROBLEM — Z79.01 CHRONIC ANTICOAGULATION: Status: ACTIVE | Noted: 2021-07-22

## 2023-01-19 PROBLEM — Z79.899 HIGH RISK MEDICATION USE: Status: ACTIVE | Noted: 2021-07-22

## 2023-01-19 PROCEDURE — 99214 OFFICE O/P EST MOD 30 MIN: CPT | Performed by: INTERNAL MEDICINE

## 2023-01-19 PROCEDURE — 93000 ELECTROCARDIOGRAM COMPLETE: CPT | Performed by: INTERNAL MEDICINE

## 2023-01-19 NOTE — PROGRESS NOTES
[facility] Cardiology  Follow Up Visit  Deon Robins  1950    VISIT DATE:  01/19/23    PCP:   Nish Rodriguez MD  [address] [place] [address]          CC:  Atrial Fibrillation (Follow up)      Problem List:  1.  Atrial fibrillation  2.  HTN  3.  LHC 2006 normal per patient,  Normal stress 2015  4.  Echo June 2021: EF 50%, grade 1 diastolic dysfunction, mild to moderate RV dilation with normal function, mild MR      History of Present Illness:  Deon Robins  Is a 72 y.o. female with pertinent cardiac history detailed above.  Patient felt worsened palpitations and fatigue on jan 10th.  Was found to be in afib.  Came to [facility]  ED and underwent cardioversion.  In sinus rhythm today.  She has been feeling fine since.  This was her first cardioversion since 2021.  She has not had any prior ablation or other antiarrhythmics besides sotalol.  QTC today is acceptable.  Unable to increase sotalol further due to resting bradycardia.  Reports continued compliance with Xarelto.  He has seen sleep medicine clinic previously but does not currently have a CPAP.      Patient Active Problem List    Diagnosis Date Noted   • Chronic anticoagulation 07/22/2021   • High risk medication use 07/22/2021   • Sinus bradycardia 07/22/2021   • Mild mitral regurgitation 07/11/2019   • Essential (primary) hypertension 09/27/2013   • Paroxysmal atrial fibrillation (HCC) 09/27/2013       Allergies   Allergen Reactions   • Niaspan [Niacin] Unknown - Low Severity   • Penicillins Rash       Social History     Socioeconomic History   • Marital status:    Tobacco Use   • Smoking status: Never   • Smokeless tobacco: Never   Vaping Use   • Vaping Use: Never used   Substance and Sexual Activity   • Alcohol use: Not Currently   • Drug use: Never   • Sexual activity: Defer       History reviewed. No pertinent family history.    Current Medications:    Current Outpatient Medications:   •  amLODIPine (NORVASC)  "5 MG tablet, Take 1 tablet by mouth Daily., Disp: 90 tablet, Rfl: 3  •  losartan (COZAAR) 25 MG tablet, Take 1 tablet by mouth 2 (Two) Times a Day., Disp: 180 tablet, Rfl: 3  •  methocarbamol (ROBAXIN) 500 MG tablet, Take 500 mg by mouth As Needed., Disp: , Rfl:   •  multivitamin (THERAGRAN) tablet tablet, Take 1 tablet by mouth Daily., Disp: , Rfl:   •  rivaroxaban (Xarelto) 20 MG tablet, Take 1 tablet by mouth Daily., Disp: 90 tablet, Rfl: 3  •  sotalol (BETAPACE) 120 MG tablet, Take 1 tablet by mouth 2 (Two) Times a Day., Disp: 180 tablet, Rfl: 3  •  VITAMIN D PO, Take 1 tablet by mouth 2 (two) times a day., Disp: , Rfl:   •  traMADol (ULTRAM) 50 MG tablet, TAKE ONE-HALF TO ONE TAB EVERY FOUR TO SIX HOURS AS NEEDED PAIN, Disp: , Rfl:      Review of Systems   Constitutional: Positive for malaise/fatigue.   Cardiovascular: Positive for palpitations (Resolved). Negative for chest pain and dyspnea on exertion.       Vitals:    01/19/23 0953   BP: 138/72   BP Location: Right arm   Patient Position: Sitting   Pulse: 55   SpO2: 98%   Weight: 88 kg (194 lb)   Height: 172.7 cm (68\")       Physical Exam  Constitutional:       Appearance: Normal appearance.   Cardiovascular:      Rate and Rhythm: Regular rhythm. Bradycardia present.      Pulses: Normal pulses.      Heart sounds: Normal heart sounds.   Pulmonary:      Effort: Pulmonary effort is normal.      Breath sounds: Normal breath sounds.   Musculoskeletal:      Right lower leg: No edema.      Left lower leg: No edema.   Neurological:      General: No focal deficit present.      Mental Status: She is alert.         Diagnostic Data:    ECG 12 Lead    Date/Time: 1/19/2023 10:17 AM  Performed by: Timbo Munguia MD  Authorized by: Timbo Munguia MD   Comparison: compared with previous ECG from 5/12/2022  Rhythm: sinus bradycardia  Rate: bradycardic  BPM: 54  Q waves: V1, V2 and V3    ST Elevation: III and aVF    Clinical impression: abnormal EKG  Clinical " impression comment:             No results found for: CHLPL, TRIG, HDL, LDLDIRECT  Lab Results   Component Value Date    GLUCOSE 104 (H) 06/21/2021    BUN 13 06/21/2021    CREATININE 0.70 06/21/2021     06/21/2021    K 3.7 06/21/2021     06/21/2021    CO2 21.0 (L) 06/21/2021     No results found for: HGBA1C  Lab Results   Component Value Date    WBC 8.34 06/21/2021    HGB 14.1 06/21/2021    HCT 42.8 06/21/2021     06/21/2021       Assessment:  No diagnosis found.    Plan:    1.  Atrial fibrillation  -Cardioversion to sinus June 2021 and January 2023  -Continue sotalol 120 mg twice daily.  Currently sinus bradycardia with acceptable QTC   -Continue Xarelto anticoagulation  -Echo EF 50%.  There was RV dilation  -If she has more frequent breakthrough episodes we will discuss EP evaluation     2.  Hypertension  -on amlodipine, losartan,   -No changes made today     3.   ALE:  She does not have CPAP currently, will refer to sleep medicine to follow-up        Timbo Munguia MD [facility]

## 2023-08-14 RX ORDER — LOSARTAN POTASSIUM 25 MG/1
TABLET ORAL
Qty: 180 TABLET | Refills: 3 | Status: SHIPPED | OUTPATIENT
Start: 2023-08-14

## 2023-08-21 RX ORDER — AMLODIPINE BESYLATE 5 MG/1
TABLET ORAL
Qty: 90 TABLET | Refills: 3 | Status: SHIPPED | OUTPATIENT
Start: 2023-08-21

## 2023-08-22 ENCOUNTER — TELEPHONE (OUTPATIENT)
Dept: CARDIOLOGY | Facility: CLINIC | Age: 73
End: 2023-08-22
Payer: MEDICARE

## 2023-08-22 NOTE — TELEPHONE ENCOUNTER
Caller: Deon Robins    Relationship to patient: Self    Best call back number: [phone]    Type of visit: HOSPITAL FOLLOW UP     Requested date: ASAP      If rescheduling, when is the original appointment: 11-2-23     Additional notes: PT STATES SHE HAD TO GO TO THE ER FOR A-FIB AND WAS ADVISED TO FOLLOW UP WITH HER CARDIOLOGIST ASAP. PT IS SCHEDULED FOR 11-2-23 ALREADY, BUT IS INQUIRING IF SHE NEEDS TO BE SEEN BEFORE THEN. PLEASE REACH OUT TO PT TO FURTHER ADVISE. THANK YOU!

## 2023-08-23 NOTE — TELEPHONE ENCOUNTER
Called pt, she has not gone into a-fib since cardioversion 8/18/23 at [facility]. Pt states that she is feeling good, no complaints. Advised pt to continue current treatment and we can keep 11/2/23 appt. Advised pt to call back if she had any issues/changes before appt. Pt verbalizes understanding and agreeable to plan.    Records received and given to NSK for review.

## 2023-10-30 RX ORDER — SOTALOL HYDROCHLORIDE 120 MG/1
120 TABLET ORAL 2 TIMES DAILY
Qty: 14 TABLET | Refills: 0 | Status: SHIPPED | OUTPATIENT
Start: 2023-10-30 | End: 2023-11-02 | Stop reason: SDUPTHER

## 2023-11-01 NOTE — PROGRESS NOTES
[facility] Cardiology  Follow Up Visit  Deon Robins  1950    VISIT DATE:  11/02/23    PCP:   Nish Rodriguez MD  [address] [place] [address]          CC:  Follow-up (Paroxysmal atrial fibrillation)      Problem List:  1.  Atrial fibrillation  2.  HTN  3.  LHC 2006 normal per patient,  Normal stress 2015  4.  Echo June 2021: EF 50%, grade 1 diastolic dysfunction, mild to moderate RV dilation with normal function, mild MR      History of Present Illness:  Deon Robins  Is a 73 y.o. female with pertinent cardiac history detailed above.  She had episode of A-fib in January and required cardioversion had another episode in August requiring cardioversion.  She states episodes of A-fib did not seem to spontaneously resolve she needs to typically have a cardioversion.  She has not had a prior ablation.  She does have a history of sleep apnea and is currently not on CPAP.  She was seen by the [facility] sleep clinic previously but has not had recent follow-up.  She does have a chronically abnormal EKG with inferior and anterior Q waves.  A cath in 2006 did not show CAD and she had a normal stress in 2015.  She will get occasional atypical chest pains.  Rhythm today is sinus bradycardia with normal QTc.  In no acute distress currently.  Blood pressures controlled      Patient Active Problem List    Diagnosis Date Noted    Chronic anticoagulation 07/22/2021    High risk medication use 07/22/2021    Sinus bradycardia 07/22/2021    Mild mitral regurgitation 07/11/2019    Essential (primary) hypertension 09/27/2013    Paroxysmal atrial fibrillation 09/27/2013       Allergies   Allergen Reactions    Niaspan [Niacin] Unknown - Low Severity    Penicillins Rash       Social History     Socioeconomic History    Marital status:    Tobacco Use    Smoking status: Never    Smokeless tobacco: Never   Vaping Use    Vaping Use: Never used   Substance and Sexual Activity    Alcohol use: Not  "Currently    Drug use: Never    Sexual activity: Defer       History reviewed. No pertinent family history.    Current Medications:    Current Outpatient Medications:     amLODIPine (NORVASC) 5 MG tablet, TAKE ONE TABLET BY MOUTH ONCE DAILY, Disp: 90 tablet, Rfl: 3    calcium carbonate (OS-SUGEY) 600 MG tablet, Take 1 tablet by mouth 2 (Two) Times a Day. With vit D, Disp: , Rfl:     losartan (COZAAR) 25 MG tablet, TAKE ONE TABLET BY MOUTH TWICE DAILY, Disp: 180 tablet, Rfl: 3    pravastatin (PRAVACHOL) 20 MG tablet, Take 1 tablet by mouth every night at bedtime., Disp: , Rfl:     rivaroxaban (Xarelto) 20 MG tablet, Take 1 tablet by mouth Daily., Disp: 90 tablet, Rfl: 3    sotalol (BETAPACE) 120 MG tablet, TAKE ONE TABLET BY MOUTH TWICE DAILY, Disp: 14 tablet, Rfl: 0     Review of Systems   Cardiovascular:  Positive for chest pain (Atypical) and palpitations.   Respiratory:  Positive for shortness of breath.        Vitals:    11/02/23 1007   BP: 126/66   BP Location: Right arm   Patient Position: Sitting   Pulse: 55   SpO2: 97%   Weight: 87.5 kg (192 lb 12.8 oz)   Height: 171.5 cm (67.5\")       Physical Exam  Constitutional:       Appearance: Normal appearance.   Cardiovascular:      Rate and Rhythm: Regular rhythm. Bradycardia present.   Musculoskeletal:      Right lower leg: No edema.      Left lower leg: No edema.   Neurological:      Mental Status: She is alert.         Diagnostic Data:    ECG 12 Lead    Date/Time: 11/2/2023 10:27 AM  Performed by: Timbo Munguia MD    Authorized by: Timbo Munguia MD  Comparison: compared with previous ECG from 1/19/2023  Rhythm: sinus bradycardia  Rate: bradycardic  BPM: 53  Q waves: V1, V2, V3, III and aVF    QRS axis: left        No results found for: \"CHLPL\", \"TRIG\", \"HDL\", \"LDLDIRECT\"  Lab Results   Component Value Date    GLUCOSE 104 (H) 06/21/2021    BUN 13 06/21/2021    CREATININE 0.70 06/21/2021     06/21/2021    K 3.7 06/21/2021     " "06/21/2021    CO2 21.0 (L) 06/21/2021     No results found for: \"HGBA1C\"  Lab Results   Component Value Date    WBC 8.34 06/21/2021    HGB 14.1 06/21/2021    HCT 42.8 06/21/2021     06/21/2021       Assessment:   Diagnosis Plan   1. Paroxysmal atrial fibrillation  Stress Test With Myocardial Perfusion One Day      2. Abnormal electrocardiogram (ECG) (EKG)  Stress Test With Myocardial Perfusion One Day          Plan:    1.  Atrial fibrillation  -Cardioversion to sinus June 2021, January 2023, August 2023  -Continue sotalol 120 mg twice daily.  Currently sinus bradycardia with acceptable QTC   -Continue Xarelto anticoagulation  -Echo EF 50%.  There was RV dilation  -Recommend EP referral discussion of ablation secondary to need for multiple cardioversions this year     2.  Hypertension  -on amlodipine, losartan,   -Controlled     3.   ALE:  She does not have CPAP currently, will refer to sleep medicine to follow-up    4.  HLD  -Not optimally controlled, triglycerides 322,   -Increase pravastatin to 40 mg    5.  Chronically abnormal EKG and atypical chest pain  -A Lexiscan nuclear stress test will be obtained      ACP discussion was held with the patient during this visit. Patient has an advance directive (not in EMR), copy requested.      Timbo Munguia MD FAC     "

## 2023-11-02 ENCOUNTER — OFFICE VISIT (OUTPATIENT)
Dept: CARDIOLOGY | Facility: CLINIC | Age: 73
End: 2023-11-02
Payer: MEDICARE

## 2023-11-02 ENCOUNTER — TELEPHONE (OUTPATIENT)
Dept: SLEEP MEDICINE | Facility: HOSPITAL | Age: 73
End: 2023-11-02
Payer: MEDICARE

## 2023-11-02 VITALS
HEART RATE: 55 BPM | WEIGHT: 192.8 LBS | DIASTOLIC BLOOD PRESSURE: 66 MMHG | HEIGHT: 68 IN | SYSTOLIC BLOOD PRESSURE: 126 MMHG | OXYGEN SATURATION: 97 % | BODY MASS INDEX: 29.22 KG/M2

## 2023-11-02 DIAGNOSIS — I48.0 PAROXYSMAL ATRIAL FIBRILLATION: Primary | ICD-10-CM

## 2023-11-02 DIAGNOSIS — R94.31 ABNORMAL ELECTROCARDIOGRAM (ECG) (EKG): ICD-10-CM

## 2023-11-02 PROCEDURE — 99214 OFFICE O/P EST MOD 30 MIN: CPT | Performed by: INTERNAL MEDICINE

## 2023-11-02 PROCEDURE — 3078F DIAST BP <80 MM HG: CPT | Performed by: INTERNAL MEDICINE

## 2023-11-02 PROCEDURE — 93000 ELECTROCARDIOGRAM COMPLETE: CPT | Performed by: INTERNAL MEDICINE

## 2023-11-02 PROCEDURE — 3074F SYST BP LT 130 MM HG: CPT | Performed by: INTERNAL MEDICINE

## 2023-11-02 RX ORDER — PRAVASTATIN SODIUM 20 MG
40 TABLET ORAL
Qty: 90 TABLET | Refills: 3 | Status: SHIPPED | OUTPATIENT
Start: 2023-11-02 | End: 2023-11-02 | Stop reason: SDUPTHER

## 2023-11-02 RX ORDER — PRAVASTATIN SODIUM 40 MG
40 TABLET ORAL
Qty: 30 TABLET | Refills: 11 | Status: SHIPPED | OUTPATIENT
Start: 2023-11-02

## 2023-11-02 RX ORDER — PHENOL 1.4 %
600 AEROSOL, SPRAY (ML) MUCOUS MEMBRANE 2 TIMES DAILY
COMMUNITY

## 2023-11-02 RX ORDER — SOTALOL HYDROCHLORIDE 120 MG/1
120 TABLET ORAL 2 TIMES DAILY
Qty: 14 TABLET | Refills: 0 | Status: SHIPPED | OUTPATIENT
Start: 2023-11-02

## 2023-11-02 RX ORDER — PRAVASTATIN SODIUM 20 MG
20 TABLET ORAL
COMMUNITY
Start: 2023-08-30 | End: 2023-11-02 | Stop reason: SDUPTHER

## 2023-11-02 NOTE — TELEPHONE ENCOUNTER
RECEIVED CALL FROM DR ALVARADO'S OFFICE ASKING THAT WE CALL PT FOR FOLLOW UP APPT.  PATIENT HAS NOT BEEN SEEN SINCE MARCH '22.  LVM FOR PT TO RETURN CALL AND SCHEDULE F/U APPT TO DISCUSS PAP THERAPY.  PATIENT CAN BE SCHEDULED WITH APRN.

## 2023-11-09 RX ORDER — SOTALOL HYDROCHLORIDE 120 MG/1
120 TABLET ORAL 2 TIMES DAILY
Qty: 180 TABLET | Refills: 1 | Status: SHIPPED | OUTPATIENT
Start: 2023-11-09

## 2023-12-04 NOTE — PROGRESS NOTES
Cardiac Electrophysiology Outpatient Note  [facility] at [facility]    Office Visit     Deon Robins  [id number]  12/05/2023    Primary Care Physician: Nish Rodriguez MD    Referred By: Timbo Munguia,*    Subjective     Chief Complaint   Patient presents with    Paroxysmal atrial fibrillation     Problem List:  Paroxysmal atrial fibrillation  Diagnosis 2006  ECV to sinus 6/2021, 1/2023, and 8/2023  Echo 6/2021: LVEF 50%, grade I LV DD, mild MR, mild to mod RV dilation  Breakthrough atrial fibrillation on sotalol 120 mg bid  Abnormal EKG/atypical chest pain  Chronically abnormal with inferior and anterior Q-waves  LHC 2006 remote normal, idb  Normal stress 2015, idb  Pending stress 11/23  Hypertension  Hyperlipidemia  ALE  Not on CPAP. Pending sleep med referral (12/2023)        History of Present Illness:   Deon Robins is a 73 y.o. female who presents to my electrophysiology clinic as a referral from Dr. Munguia for consideration of ablation in setting of paroxysmal atrial fibrillation requiring multiple cardioversions this year despite sotalol therapy.  She is initially diagnosed in 2006.  Her atrial fibrillation occurred intermittently from then up until around 2017 at that point she was started on sotalol.  She has been on sotalol since.  She has required cardioversions x3 since June 2021 despite sotalol therapy.  She is in sinus rhythm today.  When she is out of rhythm, she feels mild chest tightness, shortness of breath, fatigue and heart racing.  She was seen at [facility] 3 days ago right upper quadrant pain and was believed to have some degree of cholecystitis.  She is waiting to hear back from the GI doctors/surgeons about a plan going forward.  Her left hand is decently swollen.  She is told that this was related to her gallbladder somehow.  Ultrasound was negative for clot according to the patient.  We do not have records of this.  She has a  "history of sleep apnea, but has not been using his CPAP.  She currently has ambulatory referral to sleep medicine to reestablish treatment but has not heard from them yet. Currently she does feel that sotalol is causing her significant fatigue.    Past Medical History:   Diagnosis Date    Atrial fibrillation     Chicken pox     Edema of left upper arm 11/28/2023    Hypertension     Measles        Past Surgical History:   Procedure Laterality Date    COLONOSCOPY      HYSTERECTOMY      TOTAL KNEE ARTHROPLASTY      both knees    WISDOM TOOTH EXTRACTION         History reviewed. No pertinent family history.    Social History     Socioeconomic History    Marital status:    Tobacco Use    Smoking status: Never     Passive exposure: Current    Smokeless tobacco: Never   Vaping Use    Vaping Use: Never used   Substance and Sexual Activity    Alcohol use: Not Currently    Drug use: Never    Sexual activity: Defer         Current Outpatient Medications:     amLODIPine (NORVASC) 5 MG tablet, TAKE ONE TABLET BY MOUTH ONCE DAILY, Disp: 90 tablet, Rfl: 3    calcium carbonate (OS-SUGEY) 600 MG tablet, Take 1 tablet by mouth 2 (Two) Times a Day. With vit D, Disp: , Rfl:     HYDROcodone-acetaminophen (NORCO) 5-325 MG per tablet, TAKE ONE TABLET BY MOUTH THREE TIMES DAILY AS NEEDED FOR MODERATE PAIN, Disp: , Rfl:     losartan (COZAAR) 25 MG tablet, TAKE ONE TABLET BY MOUTH TWICE DAILY, Disp: 180 tablet, Rfl: 3    pravastatin (PRAVACHOL) 20 MG tablet, Take 2 tablets by mouth every night at bedtime., Disp: , Rfl:     rivaroxaban (Xarelto) 20 MG tablet, Take 1 tablet by mouth Daily., Disp: 90 tablet, Rfl: 3    sotalol (BETAPACE) 120 MG tablet, Take 1 tablet by mouth 2 (Two) Times a Day., Disp: 180 tablet, Rfl: 1    Allergies:   Allergies   Allergen Reactions    Niaspan [Niacin] Unknown - Low Severity    Penicillins Rash       Objective   Vital Signs: Blood pressure 134/62, pulse 55, height 170.2 cm (67\"), weight 86.6 kg (191 lb), " "SpO2 97%.    PHYSICAL EXAM  General appearance: Awake, alert, cooperative  Lungs: Clear to ascultation bilaterally  Heart: Regular rate and rhythm, no murmurs, 2+ LE pulses, no lower extremity swelling, distal RUE 2+ edema 2 inches above wrist and distal  Skin: Skin color, turgor normal, no rashes or lesions  Neurologic: Grossly normal     Lab Results   Component Value Date    GLUCOSE 104 (H) 06/21/2021    CALCIUM 9.9 06/21/2021     06/21/2021    K 3.7 06/21/2021    CO2 21.0 (L) 06/21/2021     06/21/2021    BUN 13 06/21/2021    CREATININE 0.70 06/21/2021    EGFRIFNONA 83 06/21/2021    BCR 18.6 06/21/2021    ANIONGAP 13.0 06/21/2021     Lab Results   Component Value Date    WBC 8.34 06/21/2021    HGB 14.1 06/21/2021    HCT 42.8 06/21/2021    MCV 89.2 06/21/2021     06/21/2021     No results found for: \"INR\", \"PROTIME\"  No results found for: \"TSH\", \"J5TDMNX\", \"T3CYLBV\", \"THYROIDAB\"       Results for orders placed during the hospital encounter of 06/21/21    Adult Transthoracic Echo Complete W/ Cont if Necessary Per Protocol    Interpretation Summary  · Estimated left ventricular EF = 50% Left ventricular systolic function is low normal  · Left ventricular diastolic function is consistent with (grade I) impaired relaxation.  · The right ventricular cavity is mild to moderately dilated. Normal RV systolic function  · Mild mitral annular calcification is present. Mild mitral valve regurgitation is present         I personally viewed and interpreted the patient's EKG/Telemetry/lab data      ECG 12 Lead    Date/Time: 12/5/2023 11:03 AM  Performed by: Chapin Aguilar PA-C    Authorized by: Chapin Aguilar PA-C  Comparison: compared with previous ECG from 1/19/2023  Similar to previous ECG  Rhythm: sinus bradycardia  Rate: bradycardic  BPM: 55  QRS axis: left  Other findings: low voltage    Clinical impression: abnormal EKG          Deon Robins  reports that she has never smoked. She has been exposed " to tobacco smoke. She has never used smokeless tobacco..        Advance Care Planning   Advance Care Planning: ACP discussion was declined by the patient. Patient does not have an advance directive, information provided.     Assessment & Plan    1. Paroxysmal atrial fibrillation  Diagnosis ~2006. Continue Xarelto for stroke prophylaxis.   She has been on sotalol since 2017. She is curently on 120 mg bid and feels that this causes her significant fatigue. She holman also had breakthrough atrial fibrillation on this dosage requiring ECV x2 in the past year. We discussed pursuing a pulmonary vein ablation with the goal of maintaining sinus rhythm without needing any antiarrhythmic medications.  After discussion she would like to proceed with catheter ablation.  Will work on scheduling this.    2. Essential Hypertension  BP controlled in office today. Continue current antihypertensive regimen.    3. ALE  Dr. Munguia's office has referred her to reestablish with sleep medicine and ideally reestabish CPAP therapy which will help her maintain sinus rhythm as well. We discussed weight loss and avoidance of alcohol as other measures to ensure the best chances of maintaining sinus rhythm.      Follow Up:  Return for f/u after procedure.      Thank you for allowing me to participate in the care of your patient. Please do not hesitate to contact me with additional questions or concerns.     Scribed by Chapin Aguilar PA-C for:    Paul Corbin M.D.  Cardiac Electrophysiologist  [facility]      I, Paul Corbin MD, personally performed the services described in this documentation as scribed by the above named individual in my presence, and it is both accurate and complete.  12/5/2023  12:56 EST

## 2023-12-05 ENCOUNTER — OFFICE VISIT (OUTPATIENT)
Dept: CARDIOLOGY | Facility: CLINIC | Age: 73
End: 2023-12-05
Payer: MEDICARE

## 2023-12-05 VITALS
OXYGEN SATURATION: 97 % | BODY MASS INDEX: 29.98 KG/M2 | HEIGHT: 67 IN | SYSTOLIC BLOOD PRESSURE: 134 MMHG | HEART RATE: 55 BPM | DIASTOLIC BLOOD PRESSURE: 62 MMHG | WEIGHT: 191 LBS

## 2023-12-05 DIAGNOSIS — I10 ESSENTIAL (PRIMARY) HYPERTENSION: ICD-10-CM

## 2023-12-05 DIAGNOSIS — I48.0 PAROXYSMAL ATRIAL FIBRILLATION: Primary | ICD-10-CM

## 2023-12-05 RX ORDER — PRAVASTATIN SODIUM 20 MG
40 TABLET ORAL
COMMUNITY
Start: 2023-11-02

## 2023-12-05 RX ORDER — HYDROCODONE BITARTRATE AND ACETAMINOPHEN 5; 325 MG/1; MG/1
TABLET ORAL
COMMUNITY
Start: 2023-12-02

## 2023-12-08 DIAGNOSIS — I48.0 PAROXYSMAL ATRIAL FIBRILLATION: Primary | ICD-10-CM

## 2023-12-21 ENCOUNTER — OUTSIDE FACILITY SERVICE (OUTPATIENT)
Dept: CARDIOLOGY | Facility: CLINIC | Age: 73
End: 2023-12-21
Payer: MEDICARE

## 2024-01-17 ENCOUNTER — PREP FOR SURGERY (OUTPATIENT)
Dept: OTHER | Facility: HOSPITAL | Age: 74
End: 2024-01-17
Payer: MEDICARE

## 2024-01-17 DIAGNOSIS — I48.0 PAROXYSMAL ATRIAL FIBRILLATION: Primary | ICD-10-CM

## 2024-01-17 RX ORDER — SODIUM CHLORIDE 9 MG/ML
40 INJECTION, SOLUTION INTRAVENOUS AS NEEDED
OUTPATIENT
Start: 2024-01-17

## 2024-01-17 RX ORDER — NITROGLYCERIN 0.4 MG/1
0.4 TABLET SUBLINGUAL
OUTPATIENT
Start: 2024-01-17

## 2024-01-17 RX ORDER — ONDANSETRON 2 MG/ML
4 INJECTION INTRAMUSCULAR; INTRAVENOUS EVERY 6 HOURS PRN
OUTPATIENT
Start: 2024-01-17

## 2024-01-17 RX ORDER — ACETAMINOPHEN 325 MG/1
650 TABLET ORAL EVERY 4 HOURS PRN
OUTPATIENT
Start: 2024-01-17

## 2024-02-01 ENCOUNTER — HOSPITAL ENCOUNTER (OUTPATIENT)
Dept: CT IMAGING | Facility: HOSPITAL | Age: 74
Discharge: HOME | End: 2024-02-01
Payer: MEDICARE

## 2024-02-01 ENCOUNTER — PRE-ADMISSION TESTING (OUTPATIENT)
Dept: PREADMISSION TESTING | Facility: HOSPITAL | Age: 74
End: 2024-02-01
Payer: MEDICARE

## 2024-02-01 DIAGNOSIS — I48.0 PAROXYSMAL ATRIAL FIBRILLATION: ICD-10-CM

## 2024-02-01 LAB
ANION GAP SERPL CALCULATED.3IONS-SCNC: 10 MMOL/L (ref 5–15)
BUN SERPL-MCNC: 7 MG/DL (ref 8–23)
BUN/CREAT SERPL: 9.7 (ref 7–25)
CALCIUM SPEC-SCNC: 10.1 MG/DL (ref 8.6–10.5)
CHLORIDE SERPL-SCNC: 103 MMOL/L (ref 98–107)
CO2 SERPL-SCNC: 27 MMOL/L (ref 22–29)
CREAT SERPL-MCNC: 0.72 MG/DL (ref 0.57–1)
DEPRECATED RDW RBC AUTO: 41.9 FL (ref 37–54)
EGFRCR SERPLBLD CKD-EPI 2021: 89 ML/MIN/1.73
ERYTHROCYTE [DISTWIDTH] IN BLOOD BY AUTOMATED COUNT: 12.5 % (ref 12.3–15.4)
GLUCOSE SERPL-MCNC: 101 MG/DL (ref 65–99)
HCT VFR BLD AUTO: 43 % (ref 34–46.6)
HGB BLD-MCNC: 14.7 G/DL (ref 12–15.9)
MCH RBC QN AUTO: 31.3 PG (ref 26.6–33)
MCHC RBC AUTO-ENTMCNC: 34.2 G/DL (ref 31.5–35.7)
MCV RBC AUTO: 91.5 FL (ref 79–97)
PLATELET # BLD AUTO: 286 10*3/MM3 (ref 140–450)
PMV BLD AUTO: 10.1 FL (ref 6–12)
POTASSIUM SERPL-SCNC: 3.7 MMOL/L (ref 3.5–5.2)
RBC # BLD AUTO: 4.7 10*6/MM3 (ref 3.77–5.28)
SODIUM SERPL-SCNC: 140 MMOL/L (ref 136–145)
WBC NRBC COR # BLD AUTO: 9.23 10*3/MM3 (ref 3.4–10.8)

## 2024-02-01 PROCEDURE — 80048 BASIC METABOLIC PNL TOTAL CA: CPT

## 2024-02-01 PROCEDURE — 85027 COMPLETE CBC AUTOMATED: CPT

## 2024-02-01 PROCEDURE — 25510000001 IOPAMIDOL PER 1 ML: Performed by: STUDENT IN AN ORGANIZED HEALTH CARE EDUCATION/TRAINING PROGRAM

## 2024-02-01 PROCEDURE — 36415 COLL VENOUS BLD VENIPUNCTURE: CPT

## 2024-02-01 PROCEDURE — 71275 CT ANGIOGRAPHY CHEST: CPT

## 2024-02-01 RX ADMIN — IOPAMIDOL 80 ML: 755 INJECTION, SOLUTION INTRAVENOUS at 12:40

## 2024-02-01 NOTE — DISCHARGE INSTRUCTIONS
Dear Patient,    Drink plenty of fluids the day before your procedure to ensure you are well hydrated, unless otherwise directed by your physician.    Do NOT eat after midnight the night before your procedure.   You may have clear liquids only up to three hours before your scheduled arrival time (Water is best, but clear liquids can also include coffee without cream or milk, fruit juice without pulp, clear broth, and clear gelatin).  During the three hour pre-procedure timeframe, NOTHING BY MOUTH (NPO).    We encourage you to drink 8 ounces of water three to four hours before your scheduled arrival time.    Take your medications as instructed by your doctor.    Following your procedure, be sure to drink plenty of fluids to continue flushing the kidneys if dye was utilized during your procedure (cardiac catheterization)    Benefits of hydrating before and after your procedure include:    -Improved hydration helps prevent potential harm to the kidneys by flushing the contrast/dye used during your procedure (if applicable)    -Lower post-procedure complications    -Improved patient comfort     Do NOT smoke after midnight the night before your procedure.    Glasses and jewelry may be worn, but dentures must be removed prior to your procedure.    Leave any items you consider valuable at home.      MORNING of your Procedure, please bring the following:     -Photo ID and insurance card(s)    -ALL medications in their ORIGINAL CONTAINERS or accurate medication list.    -Co-pay and/or deductible required by your insurance   -Copy of living will or power of  document (if not brought to Pre-Admission Testing department)   -CPAP mask and tubing, not your machine (if applicable)   -Relaxation aids (music, books, magazines)    Family members may wait in [facility] waiting area during procedure.    Need to make arrangements for transportation prior to discharge.

## 2024-02-01 NOTE — PAT
An arrival time for procedure was not provided during PAT visit. If patient had any questions or concerns about their arrival time, they were instructed to contact their surgeon/physician.  Additionally, if the patient referred to an arrival time that was acquired from their my chart account, patient was encouraged to verify that time with their surgeon/physician. Arrival times are NOT provided in Pre Admission Testing Department.    Patient viewed general PAT education video as instructed in their preoperative information received from their surgeon.  Patient stated the general PAT education video was viewed in its entirety and survey completed.  Copies of PAT general education handouts (Incentive Spirometry, Meds to Beds Program, Patient Belongings, Pre-op skin preparation instructions, Blood Glucose testing, Visitor policy, Surgery FAQ, Code H) distributed to patient if not printed. Education related to the PAT pass and skin preparation for surgery (if applicable) completed in PAT as a reinforcement to PAT education video. Patient instructed to return PAT pass provided today as well as completed skin preparation sheet (if applicable) on the day of procedure.     Additionally if patient had not viewed video yet but intended to view it at home or in our waiting area, then referred them to the handout with QR code/link provided during PAT visit.  Instructed patient to complete survey after viewing the video in its entirety.  Encouraged patient/family to read PAT general education handouts thoroughly and notify PAT staff with any questions or concerns. Patient verbalized understanding of all information and priority content.    Patient denies any current skin issues.     Patient directed to Radiology Department for CT after Pre Admission Testing Appointment.

## 2024-02-05 NOTE — NURSING NOTE
" PRE-PVA ASSESSMENT  Deon Robins 1950   [address] [facility] [address]   [phone]      Referral Source: Timbo Munguia   Information obtained from: [x] Medical record review  [x] Patient report  Scheduled for: PVA on 2/7/24 with Dr. Corbin  Allergies   Allergen Reactions    Niaspan [Niacin] Unknown - Low Severity     FLUSHING, WEAKNESS, CYANOSIS    Penicillins Rash       AFib Specific History:  AFIBTYPE: paroxysmal    CHADS-VASc Risk Assessment              3 Total Score    1 Hypertension    1 Age 65-74    1 Sex: Female        Criteria that do not apply:    CHF    Age >/= 75    DM    PRIOR STROKE/TIA/THROMBO    Vascular Disease          Anticoagulation: Xarelto 20 mg daily NO MISSED DOSES  Cardioversion x 3  Failed AAD(s): sotalol   Prior Ablation: No    Is Ms. Robins aware of her AFib? Yes   Onset: 2006    Exacerbations: No    Frequency: twice in 6 months Alleviations: No    Duration: days      Symptoms:   [] Palpitations:    [x] Chest Discomfort:    [] Dizziness:    [] Presyncope:    [] Lightheadedness:   [] Syncope:    [x] Fatigue:    [] Other:     [x] Short of Breath:     Last Echo(s):  [x] TTE Date: 6/21/21         Estimated left ventricular EF = 50% Left ventricular systolic function is low normal  Left ventricular diastolic function is consistent with (grade I) impaired relaxation.  The right ventricular cavity is mild to moderately dilated. Normal RV systolic function  Mild mitral annular calcification is present. Mild mitral valve regurgitation is present        LA: 2.9 cm          Past medical History:   [] Diabetes  No               No results found for: \"HGBA1C\"       [] HYPOthyroidism No   [] HYPERthyroidism No        No results found for: \"TSH\"    [x] HTN        [x] Tx norvasc    [] Heart Failure  No   [] CVA   No                             [] TIA  No        [] Ischemic         [] Hemorrhagic         [] Nonischemic         [] Embolic        [] Diastolic    [] CAD  "    No    [] MI  No          [x] Dyslipidemia on pravachol   [] Statin indicated    [x] Ischemic Evaluation       [x] Stress Test: [facility],12/31/23 No evidence of ischemia, EF 76%       [x] Heart Cath: [facility] 2006 remote normal, idb    [x] Sleep Apnea Diagnosed patient does not use a device.  Dr Munguia rec'd re eval patient did not proceed.  Encouraged patient to have ALE assessed.  Referral for sleep medicine placed     [] Obesity No BMI 29.91     Summary of Patient Contact:    I spoke with Ms. Robins about her upcoming PVA.   She was well informed about the procedure from prior discussion with Dr. Corbin and from reading the provided literature.  We discussed the procedure at length including risks, anesthesia, intra-op procedures, recovery, bedrest, sheath removal, discharge criteria, normal post-procedure expectations, and success rates.  I answered a few remaining questions. Ms. Robins verbalized understanding and she is ready to proceed.       Annie Cleary RN

## 2024-02-06 ENCOUNTER — ANESTHESIA EVENT (OUTPATIENT)
Dept: CARDIOLOGY | Facility: HOSPITAL | Age: 74
End: 2024-02-06
Payer: MEDICARE

## 2024-02-06 DIAGNOSIS — R06.83 SNORES: Primary | ICD-10-CM

## 2024-02-06 DIAGNOSIS — I48.0 PAROXYSMAL ATRIAL FIBRILLATION: Primary | ICD-10-CM

## 2024-02-06 RX ORDER — FAMOTIDINE 10 MG/ML
20 INJECTION, SOLUTION INTRAVENOUS ONCE
Status: CANCELLED | OUTPATIENT
Start: 2024-02-06 | End: 2024-02-06

## 2024-02-07 ENCOUNTER — ANESTHESIA (OUTPATIENT)
Dept: CARDIOLOGY | Facility: HOSPITAL | Age: 74
End: 2024-02-07
Payer: MEDICARE

## 2024-02-07 ENCOUNTER — HOSPITAL ENCOUNTER (OUTPATIENT)
Facility: HOSPITAL | Age: 74
Setting detail: HOSPITAL OUTPATIENT SURGERY
Discharge: HOME | End: 2024-02-07
Attending: STUDENT IN AN ORGANIZED HEALTH CARE EDUCATION/TRAINING PROGRAM | Admitting: STUDENT IN AN ORGANIZED HEALTH CARE EDUCATION/TRAINING PROGRAM
Payer: MEDICARE

## 2024-02-07 VITALS
HEIGHT: 68 IN | TEMPERATURE: 97 F | RESPIRATION RATE: 30 BRPM | WEIGHT: 184.4 LBS | BODY MASS INDEX: 27.95 KG/M2 | DIASTOLIC BLOOD PRESSURE: 86 MMHG | SYSTOLIC BLOOD PRESSURE: 156 MMHG | HEART RATE: 93 BPM | OXYGEN SATURATION: 93 %

## 2024-02-07 DIAGNOSIS — I48.0 PAROXYSMAL ATRIAL FIBRILLATION: ICD-10-CM

## 2024-02-07 LAB
ACT BLD: 320 SECONDS (ref 82–152)
ACT BLD: 347 SECONDS (ref 82–152)
ACT BLD: 347 SECONDS (ref 82–152)
ACT BLD: 358 SECONDS (ref 82–152)
ACT BLD: 396 SECONDS (ref 82–152)
BUN BLDA-MCNC: 3 MG/DL (ref 8–26)
CA-I BLDA-SCNC: 1.23 MMOL/L (ref 1.2–1.32)
CHLORIDE BLDA-SCNC: 104 MMOL/L (ref 98–109)
CO2 BLDA-SCNC: 22 MMOL/L (ref 24–29)
CREAT BLDA-MCNC: 0.5 MG/DL (ref 0.6–1.3)
EGFRCR SERPLBLD CKD-EPI 2021: 99.8 ML/MIN/1.73
GLUCOSE BLDC GLUCOMTR-MCNC: 118 MG/DL (ref 70–130)
HCT VFR BLDA CALC: 38 % (ref 38–51)
HGB BLDA-MCNC: 12.9 G/DL (ref 12–17)
POTASSIUM BLDA-SCNC: 3.5 MMOL/L (ref 3.5–4.9)
SODIUM BLD-SCNC: 142 MMOL/L (ref 138–146)

## 2024-02-07 PROCEDURE — 25010000002 PHENYLEPHRINE 10 MG/ML SOLUTION 1 ML VIAL: Performed by: NURSE ANESTHETIST, CERTIFIED REGISTERED

## 2024-02-07 PROCEDURE — 25010000002 DEXAMETHASONE PER 1 MG: Performed by: NURSE ANESTHETIST, CERTIFIED REGISTERED

## 2024-02-07 PROCEDURE — 25810000003 SODIUM CHLORIDE 0.9 % SOLUTION 250 ML FLEX CONT: Performed by: NURSE ANESTHETIST, CERTIFIED REGISTERED

## 2024-02-07 PROCEDURE — 25810000003 SODIUM CHLORIDE 0.9 % SOLUTION: Performed by: STUDENT IN AN ORGANIZED HEALTH CARE EDUCATION/TRAINING PROGRAM

## 2024-02-07 PROCEDURE — 93655 ICAR CATH ABLTJ DSCRT ARRHYT: CPT | Performed by: STUDENT IN AN ORGANIZED HEALTH CARE EDUCATION/TRAINING PROGRAM

## 2024-02-07 PROCEDURE — C1732 CATH, EP, DIAG/ABL, 3D/VECT: HCPCS | Performed by: STUDENT IN AN ORGANIZED HEALTH CARE EDUCATION/TRAINING PROGRAM

## 2024-02-07 PROCEDURE — 93657 TX L/R ATRIAL FIB ADDL: CPT | Performed by: STUDENT IN AN ORGANIZED HEALTH CARE EDUCATION/TRAINING PROGRAM

## 2024-02-07 PROCEDURE — 25010000002 HEPARIN (PORCINE) PER 1000 UNITS: Performed by: STUDENT IN AN ORGANIZED HEALTH CARE EDUCATION/TRAINING PROGRAM

## 2024-02-07 PROCEDURE — 25010000002 PROPOFOL 10 MG/ML EMULSION: Performed by: NURSE ANESTHETIST, CERTIFIED REGISTERED

## 2024-02-07 PROCEDURE — C1893 INTRO/SHEATH, FIXED,NON-PEEL: HCPCS | Performed by: STUDENT IN AN ORGANIZED HEALTH CARE EDUCATION/TRAINING PROGRAM

## 2024-02-07 PROCEDURE — 25010000002 ONDANSETRON PER 1 MG: Performed by: NURSE ANESTHETIST, CERTIFIED REGISTERED

## 2024-02-07 PROCEDURE — 93622 COMP EP EVAL L VENTR PAC&REC: CPT | Performed by: STUDENT IN AN ORGANIZED HEALTH CARE EDUCATION/TRAINING PROGRAM

## 2024-02-07 PROCEDURE — 80047 BASIC METABLC PNL IONIZED CA: CPT

## 2024-02-07 PROCEDURE — C1894 INTRO/SHEATH, NON-LASER: HCPCS | Performed by: STUDENT IN AN ORGANIZED HEALTH CARE EDUCATION/TRAINING PROGRAM

## 2024-02-07 PROCEDURE — 85347 COAGULATION TIME ACTIVATED: CPT

## 2024-02-07 PROCEDURE — 93623 PRGRMD STIMJ&PACG IV RX NFS: CPT | Performed by: STUDENT IN AN ORGANIZED HEALTH CARE EDUCATION/TRAINING PROGRAM

## 2024-02-07 PROCEDURE — 93656 COMPRE EP EVAL ABLTJ ATR FIB: CPT | Performed by: STUDENT IN AN ORGANIZED HEALTH CARE EDUCATION/TRAINING PROGRAM

## 2024-02-07 PROCEDURE — 25010000002 PHENYLEPHRINE 10 MG/ML SOLUTION: Performed by: NURSE ANESTHETIST, CERTIFIED REGISTERED

## 2024-02-07 PROCEDURE — C1730 CATH, EP, 19 OR FEW ELECT: HCPCS | Performed by: STUDENT IN AN ORGANIZED HEALTH CARE EDUCATION/TRAINING PROGRAM

## 2024-02-07 PROCEDURE — 25010000002 PROTAMINE SULFATE PER 10 MG: Performed by: STUDENT IN AN ORGANIZED HEALTH CARE EDUCATION/TRAINING PROGRAM

## 2024-02-07 PROCEDURE — C1759 CATH, INTRA ECHOCARDIOGRAPHY: HCPCS | Performed by: STUDENT IN AN ORGANIZED HEALTH CARE EDUCATION/TRAINING PROGRAM

## 2024-02-07 PROCEDURE — 85014 HEMATOCRIT: CPT

## 2024-02-07 PROCEDURE — 25010000002 SUGAMMADEX 200 MG/2ML SOLUTION: Performed by: NURSE ANESTHETIST, CERTIFIED REGISTERED

## 2024-02-07 PROCEDURE — C1766 INTRO/SHEATH,STRBLE,NON-PEEL: HCPCS | Performed by: STUDENT IN AN ORGANIZED HEALTH CARE EDUCATION/TRAINING PROGRAM

## 2024-02-07 PROCEDURE — C1760 CLOSURE DEV, VASC: HCPCS | Performed by: STUDENT IN AN ORGANIZED HEALTH CARE EDUCATION/TRAINING PROGRAM

## 2024-02-07 RX ORDER — SODIUM CHLORIDE 0.9 % (FLUSH) 0.9 %
10 SYRINGE (ML) INJECTION AS NEEDED
Status: DISCONTINUED | OUTPATIENT
Start: 2024-02-07 | End: 2024-02-07 | Stop reason: HOSPADM

## 2024-02-07 RX ORDER — HEPARIN SODIUM 1000 [USP'U]/ML
INJECTION, SOLUTION INTRAVENOUS; SUBCUTANEOUS
Status: DISCONTINUED | OUTPATIENT
Start: 2024-02-07 | End: 2024-02-07 | Stop reason: HOSPADM

## 2024-02-07 RX ORDER — FENTANYL CITRATE 50 UG/ML
50 INJECTION, SOLUTION INTRAMUSCULAR; INTRAVENOUS
Status: DISCONTINUED | OUTPATIENT
Start: 2024-02-07 | End: 2024-02-07 | Stop reason: HOSPADM

## 2024-02-07 RX ORDER — ONDANSETRON 2 MG/ML
4 INJECTION INTRAMUSCULAR; INTRAVENOUS EVERY 6 HOURS PRN
Status: DISCONTINUED | OUTPATIENT
Start: 2024-02-07 | End: 2024-02-07 | Stop reason: HOSPADM

## 2024-02-07 RX ORDER — ONDANSETRON 2 MG/ML
INJECTION INTRAMUSCULAR; INTRAVENOUS AS NEEDED
Status: DISCONTINUED | OUTPATIENT
Start: 2024-02-07 | End: 2024-02-07 | Stop reason: SURG

## 2024-02-07 RX ORDER — ROCURONIUM BROMIDE 10 MG/ML
INJECTION, SOLUTION INTRAVENOUS AS NEEDED
Status: DISCONTINUED | OUTPATIENT
Start: 2024-02-07 | End: 2024-02-07 | Stop reason: SURG

## 2024-02-07 RX ORDER — PROMETHAZINE HYDROCHLORIDE 25 MG/1
25 SUPPOSITORY RECTAL ONCE AS NEEDED
Status: DISCONTINUED | OUTPATIENT
Start: 2024-02-07 | End: 2024-02-07 | Stop reason: HOSPADM

## 2024-02-07 RX ORDER — LIDOCAINE HYDROCHLORIDE 10 MG/ML
INJECTION, SOLUTION EPIDURAL; INFILTRATION; INTRACAUDAL; PERINEURAL AS NEEDED
Status: DISCONTINUED | OUTPATIENT
Start: 2024-02-07 | End: 2024-02-07 | Stop reason: SURG

## 2024-02-07 RX ORDER — PROMETHAZINE HYDROCHLORIDE 25 MG/1
25 TABLET ORAL ONCE AS NEEDED
Status: DISCONTINUED | OUTPATIENT
Start: 2024-02-07 | End: 2024-02-07 | Stop reason: HOSPADM

## 2024-02-07 RX ORDER — SODIUM CHLORIDE, SODIUM LACTATE, POTASSIUM CHLORIDE, CALCIUM CHLORIDE 600; 310; 30; 20 MG/100ML; MG/100ML; MG/100ML; MG/100ML
9 INJECTION, SOLUTION INTRAVENOUS CONTINUOUS
Status: DISCONTINUED | OUTPATIENT
Start: 2024-02-07 | End: 2024-02-07 | Stop reason: HOSPADM

## 2024-02-07 RX ORDER — ACETAMINOPHEN 325 MG/1
650 TABLET ORAL EVERY 4 HOURS PRN
Status: DISCONTINUED | OUTPATIENT
Start: 2024-02-07 | End: 2024-02-07 | Stop reason: HOSPADM

## 2024-02-07 RX ORDER — ACETAMINOPHEN 650 MG/1
650 SUPPOSITORY RECTAL EVERY 4 HOURS PRN
Status: DISCONTINUED | OUTPATIENT
Start: 2024-02-07 | End: 2024-02-07 | Stop reason: HOSPADM

## 2024-02-07 RX ORDER — PROTAMINE SULFATE 10 MG/ML
INJECTION, SOLUTION INTRAVENOUS
Status: DISCONTINUED | OUTPATIENT
Start: 2024-02-07 | End: 2024-02-07 | Stop reason: HOSPADM

## 2024-02-07 RX ORDER — HYDROMORPHONE HYDROCHLORIDE 1 MG/ML
0.5 INJECTION, SOLUTION INTRAMUSCULAR; INTRAVENOUS; SUBCUTANEOUS
Status: DISCONTINUED | OUTPATIENT
Start: 2024-02-07 | End: 2024-02-07 | Stop reason: HOSPADM

## 2024-02-07 RX ORDER — HYDRALAZINE HYDROCHLORIDE 20 MG/ML
5 INJECTION INTRAMUSCULAR; INTRAVENOUS
Status: DISCONTINUED | OUTPATIENT
Start: 2024-02-07 | End: 2024-02-07 | Stop reason: HOSPADM

## 2024-02-07 RX ORDER — LIDOCAINE HYDROCHLORIDE 10 MG/ML
0.5 INJECTION, SOLUTION EPIDURAL; INFILTRATION; INTRACAUDAL; PERINEURAL ONCE AS NEEDED
Status: DISCONTINUED | OUTPATIENT
Start: 2024-02-07 | End: 2024-02-07 | Stop reason: HOSPADM

## 2024-02-07 RX ORDER — PROPOFOL 10 MG/ML
VIAL (ML) INTRAVENOUS AS NEEDED
Status: DISCONTINUED | OUTPATIENT
Start: 2024-02-07 | End: 2024-02-07 | Stop reason: SURG

## 2024-02-07 RX ORDER — DEXAMETHASONE SODIUM PHOSPHATE 4 MG/ML
INJECTION, SOLUTION INTRA-ARTICULAR; INTRALESIONAL; INTRAMUSCULAR; INTRAVENOUS; SOFT TISSUE AS NEEDED
Status: DISCONTINUED | OUTPATIENT
Start: 2024-02-07 | End: 2024-02-07 | Stop reason: SURG

## 2024-02-07 RX ORDER — NITROGLYCERIN 0.4 MG/1
0.4 TABLET SUBLINGUAL
Status: DISCONTINUED | OUTPATIENT
Start: 2024-02-07 | End: 2024-02-07 | Stop reason: HOSPADM

## 2024-02-07 RX ORDER — DROPERIDOL 2.5 MG/ML
0.62 INJECTION, SOLUTION INTRAMUSCULAR; INTRAVENOUS ONCE AS NEEDED
Status: DISCONTINUED | OUTPATIENT
Start: 2024-02-07 | End: 2024-02-07 | Stop reason: HOSPADM

## 2024-02-07 RX ORDER — MIDAZOLAM HYDROCHLORIDE 1 MG/ML
0.5 INJECTION INTRAMUSCULAR; INTRAVENOUS
Status: DISCONTINUED | OUTPATIENT
Start: 2024-02-07 | End: 2024-02-07 | Stop reason: HOSPADM

## 2024-02-07 RX ORDER — SODIUM CHLORIDE 0.9 % (FLUSH) 0.9 %
10 SYRINGE (ML) INJECTION EVERY 12 HOURS SCHEDULED
Status: DISCONTINUED | OUTPATIENT
Start: 2024-02-07 | End: 2024-02-07 | Stop reason: HOSPADM

## 2024-02-07 RX ORDER — NALOXONE HCL 0.4 MG/ML
0.4 VIAL (ML) INJECTION AS NEEDED
Status: DISCONTINUED | OUTPATIENT
Start: 2024-02-07 | End: 2024-02-07 | Stop reason: HOSPADM

## 2024-02-07 RX ORDER — HYDROCODONE BITARTRATE AND ACETAMINOPHEN 5; 325 MG/1; MG/1
1 TABLET ORAL ONCE AS NEEDED
Status: DISCONTINUED | OUTPATIENT
Start: 2024-02-07 | End: 2024-02-07 | Stop reason: HOSPADM

## 2024-02-07 RX ORDER — IBUPROFEN 200 MG
400 TABLET ORAL EVERY 6 HOURS PRN
Status: DISCONTINUED | OUTPATIENT
Start: 2024-02-07 | End: 2024-02-07 | Stop reason: HOSPADM

## 2024-02-07 RX ORDER — IPRATROPIUM BROMIDE AND ALBUTEROL SULFATE 2.5; .5 MG/3ML; MG/3ML
3 SOLUTION RESPIRATORY (INHALATION) ONCE AS NEEDED
Status: DISCONTINUED | OUTPATIENT
Start: 2024-02-07 | End: 2024-02-07 | Stop reason: HOSPADM

## 2024-02-07 RX ORDER — HEPARIN SODIUM 10000 [USP'U]/100ML
INJECTION, SOLUTION INTRAVENOUS
Status: DISCONTINUED | OUTPATIENT
Start: 2024-02-07 | End: 2024-02-07 | Stop reason: HOSPADM

## 2024-02-07 RX ORDER — ONDANSETRON 2 MG/ML
4 INJECTION INTRAMUSCULAR; INTRAVENOUS ONCE AS NEEDED
Status: DISCONTINUED | OUTPATIENT
Start: 2024-02-07 | End: 2024-02-07 | Stop reason: HOSPADM

## 2024-02-07 RX ORDER — LABETALOL HYDROCHLORIDE 5 MG/ML
5 INJECTION, SOLUTION INTRAVENOUS
Status: DISCONTINUED | OUTPATIENT
Start: 2024-02-07 | End: 2024-02-07 | Stop reason: HOSPADM

## 2024-02-07 RX ORDER — PHENYLEPHRINE HYDROCHLORIDE 10 MG/ML
INJECTION INTRAVENOUS AS NEEDED
Status: DISCONTINUED | OUTPATIENT
Start: 2024-02-07 | End: 2024-02-07 | Stop reason: SURG

## 2024-02-07 RX ORDER — MEPERIDINE HYDROCHLORIDE 25 MG/ML
12.5 INJECTION INTRAMUSCULAR; INTRAVENOUS; SUBCUTANEOUS
Status: DISCONTINUED | OUTPATIENT
Start: 2024-02-07 | End: 2024-02-07 | Stop reason: HOSPADM

## 2024-02-07 RX ORDER — SODIUM CHLORIDE 0.9 % (FLUSH) 0.9 %
3 SYRINGE (ML) INJECTION EVERY 12 HOURS SCHEDULED
Status: DISCONTINUED | OUTPATIENT
Start: 2024-02-07 | End: 2024-02-07 | Stop reason: HOSPADM

## 2024-02-07 RX ORDER — SODIUM CHLORIDE 9 MG/ML
40 INJECTION, SOLUTION INTRAVENOUS AS NEEDED
Status: DISCONTINUED | OUTPATIENT
Start: 2024-02-07 | End: 2024-02-07 | Stop reason: HOSPADM

## 2024-02-07 RX ORDER — DROPERIDOL 2.5 MG/ML
0.62 INJECTION, SOLUTION INTRAMUSCULAR; INTRAVENOUS
Status: DISCONTINUED | OUTPATIENT
Start: 2024-02-07 | End: 2024-02-07 | Stop reason: HOSPADM

## 2024-02-07 RX ORDER — SODIUM CHLORIDE 0.9 % (FLUSH) 0.9 %
3-10 SYRINGE (ML) INJECTION AS NEEDED
Status: DISCONTINUED | OUTPATIENT
Start: 2024-02-07 | End: 2024-02-07 | Stop reason: HOSPADM

## 2024-02-07 RX ORDER — FAMOTIDINE 20 MG/1
20 TABLET, FILM COATED ORAL ONCE
Status: COMPLETED | OUTPATIENT
Start: 2024-02-07 | End: 2024-02-07

## 2024-02-07 RX ORDER — SODIUM CHLORIDE 9 MG/ML
INJECTION, SOLUTION INTRAVENOUS
Status: COMPLETED | OUTPATIENT
Start: 2024-02-07 | End: 2024-02-07

## 2024-02-07 RX ADMIN — PHENYLEPHRINE HYDROCHLORIDE 200 MCG: 10 INJECTION INTRAVENOUS at 11:06

## 2024-02-07 RX ADMIN — SODIUM CHLORIDE 700 ML: 9 INJECTION, SOLUTION INTRAVENOUS at 11:13

## 2024-02-07 RX ADMIN — DEXAMETHASONE SODIUM PHOSPHATE 4 MG: 4 INJECTION, SOLUTION INTRAMUSCULAR; INTRAVENOUS at 08:33

## 2024-02-07 RX ADMIN — FAMOTIDINE 20 MG: 20 TABLET, FILM COATED ORAL at 06:56

## 2024-02-07 RX ADMIN — PROPOFOL 170 MG: 10 INJECTION, EMULSION INTRAVENOUS at 08:20

## 2024-02-07 RX ADMIN — ONDANSETRON 4 MG: 2 INJECTION INTRAMUSCULAR; INTRAVENOUS at 11:10

## 2024-02-07 RX ADMIN — PHENYLEPHRINE HYDROCHLORIDE 200 MCG: 10 INJECTION INTRAVENOUS at 08:52

## 2024-02-07 RX ADMIN — ROCURONIUM BROMIDE 50 MG: 10 SOLUTION INTRAVENOUS at 08:21

## 2024-02-07 RX ADMIN — PHENYLEPHRINE HYDROCHLORIDE 300 MCG: 10 INJECTION INTRAVENOUS at 11:08

## 2024-02-07 RX ADMIN — SUGAMMADEX 200 MG: 100 INJECTION, SOLUTION INTRAVENOUS at 11:15

## 2024-02-07 RX ADMIN — PHENYLEPHRINE HYDROCHLORIDE 0.1 MCG/KG/MIN: 10 INJECTION INTRAVENOUS at 08:35

## 2024-02-07 RX ADMIN — SODIUM CHLORIDE 9 ML/HR: 9 INJECTION, SOLUTION INTRAVENOUS at 08:06

## 2024-02-07 RX ADMIN — ROCURONIUM BROMIDE 20 MG: 10 SOLUTION INTRAVENOUS at 10:26

## 2024-02-07 RX ADMIN — LIDOCAINE HYDROCHLORIDE 50 MG: 10 INJECTION, SOLUTION EPIDURAL; INFILTRATION; INTRACAUDAL; PERINEURAL at 08:20

## 2024-02-07 NOTE — Clinical Note
Replaced previous sheath in the right femoral vein. 1st 8 Fr sheath exchanged for versacross over bays wire

## 2024-02-07 NOTE — H&P
Pre-cardiac Ablation History and Physical  [facility] at [facility]      Patient:  Deon Robins  :  1950  MRN: [id number]    PCP:  Nish Rodriguez MD  PHONE:  [phone]    DATE: 2024  ID: Deon Robins is a 73 y.o. female from [place], [place]    CC: Atrial Fibrillation, Palpitations    Problem List:  Paroxysmal atrial fibrillation  Diagnosis   ECV to sinus 2021, 2023, and 2023  Echo 2021: LVEF 50%, grade I LV DD, mild MR, mild to mod RV dilation  Breakthrough atrial fibrillation on sotalol 120 mg bid  Abnormal EKG/atypical chest pain  Chronically abnormal with inferior and anterior Q-waves  LHC  remote normal, idb  Normal stress , idb  Stress : stress test 23 perfusion images are normal with a no evidence of ischemia.  EF 76%   Hypertension  Hyperlipidemia  ALE  Not on CPAP. Pending sleep med referral (2023)      BRIEF HPI: Ms. Robins is a 72 y/o female who presents today for PVA.  She is initially diagnosed in .  Her atrial fibrillation occurred intermittently from then up until around 2017 at that point she was started on sotalol.  She has been on sotalol since.  She has required cardioversions x3 since 2021 despite sotalol therapy.  When she is out of rhythm, she feels mild chest tightness, shortness of breath, fatigue and heart racing.  Since we last saw the patient in the office on 2023, she reports she has done well. She had a little more sshortness of breath when she came off of sotalol, but has not gone out of rhythm as far as she is aware.         Allergies:      Allergies   Allergen Reactions    Niaspan [Niacin] Unknown - Low Severity     FLUSHING, WEAKNESS, CYANOSIS    Penicillins Rash       MEDICATIONS:  Current Outpatient Medications   Medication Instructions    amLODIPine (NORVASC) 5 MG tablet TAKE ONE TABLET BY MOUTH ONCE DAILY    calcium carbonate (OS-SUGEY) 600 mg, Oral, 2 Times Daily, With vit D     "losartan (COZAAR) 25 MG tablet TAKE ONE TABLET BY MOUTH TWICE DAILY    pravastatin (PRAVACHOL) 40 mg, Oral, Every Night at Bedtime    rivaroxaban (XARELTO) 20 mg, Oral, Daily    sotalol (BETAPACE) 120 mg, Oral, 2 Times Daily       Past medical & surgical history, social and family history reviewed in the electronic medical record.    ROS: Pertinent positives listed in the HPI and problem list above. All others reviewed and negative.     Physical Exam:   /76 (BP Location: Left arm, Patient Position: Lying)   Pulse 79   Temp 98.2 °F (36.8 °C) (Temporal)   Ht 172.7 cm (68\")   Wt 83.6 kg (184 lb 6.4 oz)   SpO2 98%   BMI 28.04 kg/m²     Constitutional:    Well-appearing 73 y.o. y/o adult  in no acute distress   Neck:     No Jugular venous distention    Heart:    Regular rhythm and normal rate, no murmurs, rubs or gallops   Lungs:     Clear to auscultation bilaterally, no wheezes, rhales or rhonchi, nonlabored respirations       Extremities:   No gross deformities, no edema, clubbing, or cyanosis.    Pulses:    Neuro:  Psych:   Radial and dorsalis pedis pulses palpable and equal bilaterally.  No gross focal deficits  Mood and behavior appropriate for situation       Labs and Diagnostic Data:  Lab Results   Component Value Date    GLUCOSE 101 (H) 02/01/2024    BUN 7 (L) 02/01/2024    CREATININE 0.72 02/01/2024    EGFR 89.0 02/01/2024    BCR 9.7 02/01/2024    K 3.7 02/01/2024    CO2 27.0 02/01/2024    CALCIUM 10.1 02/01/2024     Lab Results   Component Value Date    WBC 9.23 02/01/2024    HGB 14.7 02/01/2024    HCT 43.0 02/01/2024    MCV 91.5 02/01/2024     02/01/2024     No results found for: \"TSH\"  No results found for: \"HGBA1C\"  No results found for: \"CHOL\", \"CHLPL\", \"TRIG\", \"HDL\", \"LDL\", \"LDLDIRECT\"      Tele: NSR    IMPRESSION:  Ms. Robins is a 73-year-old female with a history of symptomatic paroxysmal atrial fibrillation despite sotalol therapy who presents today for pulmonary vein ablation.  LD " sotalol 2/1/24  LD Xarelto last night    PLAN:  Procedure to perform: PVA. Risks, benefits and alternatives to the procedure explained to the patient and she understands and wishes to proceed.     Electronically signed by Chapin Aguilar PA-C, 02/07/24, 7:41 AM EST.

## 2024-02-07 NOTE — Clinical Note
Hemostasis started on the left femoral vein. Vascade MVP was used in achieving hemostasis. Closure device deployed in the vessel. Hemostasis achieved successfully. Closure device additional comment: x2

## 2024-02-07 NOTE — Clinical Note
Hemostasis started on the right femoral vein. Vascade MVP was used in achieving hemostasis. Closure device deployed in the vessel. Hemostasis achieved successfully. Closure device additional comment: x2

## 2024-02-07 NOTE — ANESTHESIA PREPROCEDURE EVALUATION
Anesthesia Evaluation     Patient summary reviewed and Nursing notes reviewed   NPO Solid Status: > 8 hours  NPO Liquid Status: > 2 hours           Airway   Mallampati: II  TM distance: >3 FB  Neck ROM: full  No difficulty expected  Dental      Pulmonary    (-) asthma, shortness of breath, recent URI, sleep apnea, not a smoker    ROS comment: Sats 97% RA  Cardiovascular     ECG reviewed    (+) hypertension, valvular problems/murmurs MR, dysrhythmias Atrial Fib, Paroxysmal Atrial Fib, hyperlipidemia  (-) past MI, angina, cardiac stents    ROS comment: 1. Paroxysmal atrial fibrillation  a. Diagnosis 2006  b. ECV to sinus 6/2021, 1/2023, and 8/2023  c. Echo 6/2021: LVEF 50%, grade I LV DD, mild MR, mild to mod RV dilation  d. Breakthrough atrial fibrillation on sotalol 120 mg bid    2. Abnormal EKG/atypical chest pain  a. Chronically abnormal with inferior and anterior Q-waves  b. LHC 2006 remote normal, idb  c. Normal stress 2015, idb  d. Pending stress 11/23    ECG SB   ECHO 2021 ·  EF = 50%  LVDD  (grade I) impaired relaxation.  RV moderately dilated.Mild MR       Neuro/Psych  (-) seizures, CVA  GI/Hepatic/Renal/Endo    (-) no renal disease, diabetes, no thyroid disorder    Musculoskeletal     Abdominal    Substance History      OB/GYN          Other                      Anesthesia Plan    ASA 3     general     intravenous induction     Anesthetic plan, risks, benefits, and alternatives have been provided, discussed and informed consent has been obtained with: patient.    Plan discussed with CRNA.    CODE STATUS:

## 2024-02-07 NOTE — ANESTHESIA PROCEDURE NOTES
Airway  Urgency: elective    Date/Time: 2/7/2024 8:22 AM  Airway not difficult    General Information and Staff    Patient location during procedure: OR  CRNA/CAA: Homa Biggs CRNA    Indications and Patient Condition  Indications for airway management: airway protection    Preoxygenated: yes  MILS not maintained throughout  Mask difficulty assessment: 1 - vent by mask    Final Airway Details  Final airway type: endotracheal airway      Successful airway: ETT  Cuffed: yes   Successful intubation technique: video laryngoscopy  Endotracheal tube insertion site: oral  Blade: Bearden  Blade size: 3  ETT size (mm): 7.0  Cormack-Lehane Classification: grade I - full view of glottis  Placement verified by: chest auscultation and capnometry   Measured from: lips  ETT/EBT  to lips (cm): 21  Number of attempts at approach: 1  Assessment: lips, teeth, and gum same as pre-op and atraumatic intubation    Additional Comments  Negative epigastric sounds, Breath sound equal bilaterally with symmetric chest rise and fall

## 2024-02-07 NOTE — ANESTHESIA POSTPROCEDURE EVALUATION
Patient: Deon Robins    Procedure Summary       Date: 02/07/24 Room / Location: [facility] CATH/EP LAB E / [facility] EP INVASIVE LOCATION    Anesthesia Start: 0806 Anesthesia Stop: 1127    Procedure: Ablation atrial fibrillation; hold sotalol for 5 days Diagnosis:       Paroxysmal atrial fibrillation      (afib)    Providers: Paul Corbin MD Provider: Yovani Rausch MD    Anesthesia Type: general ASA Status: 3            Anesthesia Type: general    Vitals  Vitals Value Taken Time   /65 02/07/24 1127   Temp 97 °F (36.1 °C) 02/07/24 1127   Pulse 95 02/07/24 1127   Resp 16 02/07/24 1127   SpO2 96 % 02/07/24 1127           Post Anesthesia Care and Evaluation    Patient location during evaluation: PACU  Patient participation: complete - patient participated  Level of consciousness: sleepy but conscious  Pain score: 0    Airway patency: patent  Anesthetic complications: No anesthetic complications  PONV Status: none  Cardiovascular status: blood pressure returned to baseline and acceptable  Respiratory status: nasal cannula and spontaneous ventilation  Hydration status: acceptable  No anesthesia care post op

## 2024-02-08 ENCOUNTER — CALL CENTER PROGRAMS (OUTPATIENT)
Dept: CALL CENTER | Facility: HOSPITAL | Age: 74
End: 2024-02-08
Payer: MEDICARE

## 2024-02-08 NOTE — OUTREACH NOTE
PCI/Device Survey      Flowsheet Row Responses   Facility patient discharged from? [place]   Procedure date 02/07/24   Procedure (if device, specify in description) Ablation   Performing MD Dr. Paul Corbin   Attempt successful? Yes   Call start time 1311   Call end time 1320   Person spoke with today (if not patient) and relationship Patient   Has the patient had any of the following symptoms since discharge? --  [Patient reports that she woke up in the night with headache and mild pain that she feels like from where to tube was put down her throat. Report no symptoms at this time.]   Is the patient taking prescribed medications: --  [Xarelto]   Nursing intervention Reminded to continue to take prescribed medications   Medication comments Sotalol dc'd.   Does the patient have any of the following symptoms related to the cath/surgical site? --  [Reports dressings are clean and intact to sites at time of call. patient reports will remove later today.]   Does the patient have an appointment scheduled with the cardiologist? Yes   Appointment comments Hospital Follow Up with Mauro Douglass Tuesday May 14, 2024 10:00 AM, Follow Up with Timbo Munguia Thursday Sep 5, 2024 10:00 AM   If the patient is a current smoker, are they able to teach back resources for cessation? Not a smoker   Did the patient feel prepared to go home on the same day as the procedure? Yes   Is the patient satisfied with the same day discharge process? Yes   PCI/Device call completed Yes   Wrap up additional comments Patient reports doing well today. Denies any further questions.            RENAE TAVAREZ - Registered Nurse

## 2024-04-11 NOTE — PROGRESS NOTES
"Patient: Deon Robins    YOB: 1950    Date: 04/15/2024    Primary Care Provider: Nish Rodriguez MD    Chief Complaint   Patient presents with    Colonoscopy       SUBJECTIVE:    History of present illness: Patient with a several month history of dysphagia.  Mainly to solid foods.  Feels like things are getting \"stuck \" and can take up to several minutes to pass.  Occasional reflux.  Also complaining of constipation for several years.  Can go 2 to 3 days without a bowel movements.  This can alternate with diarrhea.    The following portions of the patient's history were reviewed and updated as appropriate: allergies, current medications, past family history, past medical history, past social history, past surgical history and problem list.    Review of Systems   Constitutional:  Negative for chills, fever and unexpected weight change.   HENT:  Negative for hearing loss, trouble swallowing and voice change.    Eyes:  Negative for visual disturbance.   Respiratory:  Negative for apnea, cough, chest tightness, shortness of breath and wheezing.    Cardiovascular:  Negative for chest pain, palpitations and leg swelling.   Gastrointestinal:  Positive for abdominal pain, constipation, diarrhea and rectal pain. Negative for abdominal distention, anal bleeding, blood in stool, nausea and vomiting.   Endocrine: Negative for cold intolerance and heat intolerance.   Genitourinary:  Negative for difficulty urinating, dysuria and flank pain.   Musculoskeletal:  Negative for back pain and gait problem.   Skin:  Negative for color change, rash and wound.   Neurological:  Negative for dizziness, syncope, speech difficulty, weakness, light-headedness, numbness and headaches.   Hematological:  Negative for adenopathy. Does not bruise/bleed easily.   Psychiatric/Behavioral:  Negative for confusion. The patient is not nervous/anxious.        History:  Past Medical History:   Diagnosis Date    Atrial fibrillation     " Chicken pox     Edema of left upper arm 11/28/2023    Hyperlipidemia     Hypertension     Measles        Past Surgical History:   Procedure Laterality Date    CARDIAC ELECTROPHYSIOLOGY PROCEDURE N/A 2/7/2024    Procedure: Ablation atrial fibrillation; hold sotalol for 5 days;  Surgeon: Paul Corbin MD;  Location: [facility] INVASIVE LOCATION;  Service: Cardiovascular;  Laterality: N/A;    CARDIOVERSION  2023    COLONOSCOPY      HYSTERECTOMY      TOTAL KNEE ARTHROPLASTY      both knees    WISDOM TOOTH EXTRACTION         Family History   Problem Relation Age of Onset    Diabetes Mother        Social History     Tobacco Use    Smoking status: Never     Passive exposure: Current    Smokeless tobacco: Never   Vaping Use    Vaping status: Never Used   Substance Use Topics    Alcohol use: Not Currently    Drug use: Never       Allergies:  Allergies   Allergen Reactions    Niaspan [Niacin] Unknown - Low Severity     FLUSHING, WEAKNESS, CYANOSIS    Penicillins Rash     Beta lactam allergy details  Antibiotic reaction: (!) rash, shortness of breath  Age at reaction: adult  Dose to reaction time: unknown  Reason for antibiotic: unknown  Epinephrine required for reaction?: unknown          Medications:    Current Outpatient Medications:     amLODIPine (NORVASC) 5 MG tablet, TAKE ONE TABLET BY MOUTH ONCE DAILY, Disp: 90 tablet, Rfl: 3    calcium carbonate (OS-SUGEY) 600 MG tablet, Take 1 tablet by mouth 2 (Two) Times a Day. With vit D, Disp: , Rfl:     losartan (COZAAR) 25 MG tablet, TAKE ONE TABLET BY MOUTH TWICE DAILY, Disp: 180 tablet, Rfl: 3    pravastatin (PRAVACHOL) 20 MG tablet, Take 2 tablets by mouth every night at bedtime., Disp: , Rfl:     rivaroxaban (Xarelto) 20 MG tablet, Take 1 tablet by mouth Daily., Disp: 90 tablet, Rfl: 3    OBJECTIVE:    Vital Signs:   Vitals:    04/15/24 1309   BP: 138/77   Pulse: 86   Resp: 10   Temp: 98.2 °F (36.8 °C)   TempSrc: Temporal   SpO2: 98%   Weight: 79.8 kg (176 lb)   Height: 170.2  "cm (67\")       Physical Exam:   General Appearance:    Alert, cooperative, in no acute distress   Head:    Normocephalic, without obvious abnormality, atraumatic   Eyes:            Normal.  No scleral icterus.  PERRLA    Lungs:     Clear to auscultation,respirations regular, even and                  unlabored    Heart:    Regular rhythm and normal rate, normal S1 and S2, no            murmur   Abdomen:     Normal bowel sounds, no masses, no organomegaly, soft        non-tender, non-distended, no guarding,    Extremities:   Moves all extremities well, no edema, no cyanosis, no             redness   Skin:   No bleeding, bruising or rash   Neurologic:   Normal without gross deficits.   Psychiatric: No evidence of depression or anxiety        Results Review:   None    Review of Systems was reviewed and confirmed as accurate as documented by the MA.    ASSESSMENT/PLAN:    1. Esophageal dysphagia    2. Screening for colon cancer    3. Other constipation        Recommend an EGD with possible esophageal dilation for her esophageal dysphagia.  Also recommend a screening colonoscopy since it has been 5 years since her last colonoscopy with adenomatous polyp removal.  I explained the procedures to the patient as well as the risks of bleeding and perforation and they understand the ramifications of these potential complications and they wish to proceed.    Electronically signed by Zev Bear MD  04/15/24 11:25 EDT    "

## 2024-04-11 NOTE — H&P (VIEW-ONLY)
"Patient: Deon Robins    YOB: 1950    Date: 04/15/2024    Primary Care Provider: Nish Rodriguez MD    Chief Complaint   Patient presents with    Colonoscopy       SUBJECTIVE:    History of present illness: Patient with a several month history of dysphagia.  Mainly to solid foods.  Feels like things are getting \"stuck \" and can take up to several minutes to pass.  Occasional reflux.  Also complaining of constipation for several years.  Can go 2 to 3 days without a bowel movements.  This can alternate with diarrhea.    The following portions of the patient's history were reviewed and updated as appropriate: allergies, current medications, past family history, past medical history, past social history, past surgical history and problem list.    Review of Systems   Constitutional:  Negative for chills, fever and unexpected weight change.   HENT:  Negative for hearing loss, trouble swallowing and voice change.    Eyes:  Negative for visual disturbance.   Respiratory:  Negative for apnea, cough, chest tightness, shortness of breath and wheezing.    Cardiovascular:  Negative for chest pain, palpitations and leg swelling.   Gastrointestinal:  Positive for abdominal pain, constipation, diarrhea and rectal pain. Negative for abdominal distention, anal bleeding, blood in stool, nausea and vomiting.   Endocrine: Negative for cold intolerance and heat intolerance.   Genitourinary:  Negative for difficulty urinating, dysuria and flank pain.   Musculoskeletal:  Negative for back pain and gait problem.   Skin:  Negative for color change, rash and wound.   Neurological:  Negative for dizziness, syncope, speech difficulty, weakness, light-headedness, numbness and headaches.   Hematological:  Negative for adenopathy. Does not bruise/bleed easily.   Psychiatric/Behavioral:  Negative for confusion. The patient is not nervous/anxious.        History:  Past Medical History:   Diagnosis Date    Atrial fibrillation     " Chicken pox     Edema of left upper arm 11/28/2023    Hyperlipidemia     Hypertension     Measles        Past Surgical History:   Procedure Laterality Date    CARDIAC ELECTROPHYSIOLOGY PROCEDURE N/A 2/7/2024    Procedure: Ablation atrial fibrillation; hold sotalol for 5 days;  Surgeon: Paul Corbin MD;  Location: [facility] INVASIVE LOCATION;  Service: Cardiovascular;  Laterality: N/A;    CARDIOVERSION  2023    COLONOSCOPY      HYSTERECTOMY      TOTAL KNEE ARTHROPLASTY      both knees    WISDOM TOOTH EXTRACTION         Family History   Problem Relation Age of Onset    Diabetes Mother        Social History     Tobacco Use    Smoking status: Never     Passive exposure: Current    Smokeless tobacco: Never   Vaping Use    Vaping status: Never Used   Substance Use Topics    Alcohol use: Not Currently    Drug use: Never       Allergies:  Allergies   Allergen Reactions    Niaspan [Niacin] Unknown - Low Severity     FLUSHING, WEAKNESS, CYANOSIS    Penicillins Rash     Beta lactam allergy details  Antibiotic reaction: (!) rash, shortness of breath  Age at reaction: adult  Dose to reaction time: unknown  Reason for antibiotic: unknown  Epinephrine required for reaction?: unknown          Medications:    Current Outpatient Medications:     amLODIPine (NORVASC) 5 MG tablet, TAKE ONE TABLET BY MOUTH ONCE DAILY, Disp: 90 tablet, Rfl: 3    calcium carbonate (OS-SUGEY) 600 MG tablet, Take 1 tablet by mouth 2 (Two) Times a Day. With vit D, Disp: , Rfl:     losartan (COZAAR) 25 MG tablet, TAKE ONE TABLET BY MOUTH TWICE DAILY, Disp: 180 tablet, Rfl: 3    pravastatin (PRAVACHOL) 20 MG tablet, Take 2 tablets by mouth every night at bedtime., Disp: , Rfl:     rivaroxaban (Xarelto) 20 MG tablet, Take 1 tablet by mouth Daily., Disp: 90 tablet, Rfl: 3    OBJECTIVE:    Vital Signs:   Vitals:    04/15/24 1309   BP: 138/77   Pulse: 86   Resp: 10   Temp: 98.2 °F (36.8 °C)   TempSrc: Temporal   SpO2: 98%   Weight: 79.8 kg (176 lb)   Height: 170.2  "cm (67\")       Physical Exam:   General Appearance:    Alert, cooperative, in no acute distress   Head:    Normocephalic, without obvious abnormality, atraumatic   Eyes:            Normal.  No scleral icterus.  PERRLA    Lungs:     Clear to auscultation,respirations regular, even and                  unlabored    Heart:    Regular rhythm and normal rate, normal S1 and S2, no            murmur   Abdomen:     Normal bowel sounds, no masses, no organomegaly, soft        non-tender, non-distended, no guarding,    Extremities:   Moves all extremities well, no edema, no cyanosis, no             redness   Skin:   No bleeding, bruising or rash   Neurologic:   Normal without gross deficits.   Psychiatric: No evidence of depression or anxiety        Results Review:   None    Review of Systems was reviewed and confirmed as accurate as documented by the MA.    ASSESSMENT/PLAN:    1. Esophageal dysphagia    2. Screening for colon cancer    3. Other constipation        Recommend an EGD with possible esophageal dilation for her esophageal dysphagia.  Also recommend a screening colonoscopy since it has been 5 years since her last colonoscopy with adenomatous polyp removal.  I explained the procedures to the patient as well as the risks of bleeding and perforation and they understand the ramifications of these potential complications and they wish to proceed.    Electronically signed by Zev Bear MD  04/15/24 11:25 EDT    "

## 2024-04-15 ENCOUNTER — OFFICE VISIT (OUTPATIENT)
Dept: SURGERY | Facility: CLINIC | Age: 74
End: 2024-04-15
Payer: MEDICARE

## 2024-04-15 VITALS
HEART RATE: 86 BPM | HEIGHT: 67 IN | TEMPERATURE: 98.2 F | WEIGHT: 176 LBS | DIASTOLIC BLOOD PRESSURE: 77 MMHG | RESPIRATION RATE: 10 BRPM | BODY MASS INDEX: 27.62 KG/M2 | SYSTOLIC BLOOD PRESSURE: 138 MMHG | OXYGEN SATURATION: 98 %

## 2024-04-15 DIAGNOSIS — K59.09 OTHER CONSTIPATION: ICD-10-CM

## 2024-04-15 DIAGNOSIS — R13.19 ESOPHAGEAL DYSPHAGIA: Primary | ICD-10-CM

## 2024-04-15 DIAGNOSIS — Z86.010 ENCOUNTER FOR COLONOSCOPY DUE TO HISTORY OF ADENOMATOUS COLONIC POLYPS: ICD-10-CM

## 2024-04-15 DIAGNOSIS — Z12.11 ENCOUNTER FOR COLONOSCOPY DUE TO HISTORY OF ADENOMATOUS COLONIC POLYPS: ICD-10-CM

## 2024-04-15 PROCEDURE — 1160F RVW MEDS BY RX/DR IN RCRD: CPT | Performed by: SURGERY

## 2024-04-15 PROCEDURE — 99204 OFFICE O/P NEW MOD 45 MIN: CPT | Performed by: SURGERY

## 2024-04-15 PROCEDURE — 1159F MED LIST DOCD IN RCRD: CPT | Performed by: SURGERY

## 2024-04-15 PROCEDURE — 3078F DIAST BP <80 MM HG: CPT | Performed by: SURGERY

## 2024-04-15 PROCEDURE — 3075F SYST BP GE 130 - 139MM HG: CPT | Performed by: SURGERY

## 2024-04-15 RX ORDER — POLYETHYLENE GLYCOL 3350 17 G/17G
POWDER, FOR SOLUTION ORAL
Qty: 238 G | Refills: 0 | Status: SHIPPED | OUTPATIENT
Start: 2024-04-15

## 2024-04-15 RX ORDER — BISACODYL 5 MG/1
TABLET, DELAYED RELEASE ORAL
Qty: 4 TABLET | Refills: 0 | Status: SHIPPED | OUTPATIENT
Start: 2024-04-15

## 2024-04-18 ENCOUNTER — TELEPHONE (OUTPATIENT)
Dept: SURGERY | Facility: CLINIC | Age: 74
End: 2024-04-18
Payer: MEDICARE

## 2024-04-18 PROBLEM — Z86.0101 ENCOUNTER FOR COLONOSCOPY DUE TO HISTORY OF ADENOMATOUS COLONIC POLYPS: Status: ACTIVE | Noted: 2024-04-15

## 2024-04-18 PROBLEM — Z12.11 ENCOUNTER FOR COLONOSCOPY DUE TO HISTORY OF ADENOMATOUS COLONIC POLYPS: Status: ACTIVE | Noted: 2024-04-15

## 2024-04-18 PROBLEM — Z86.010 ENCOUNTER FOR COLONOSCOPY DUE TO HISTORY OF ADENOMATOUS COLONIC POLYPS: Status: ACTIVE | Noted: 2024-04-15

## 2024-04-18 PROBLEM — R13.19 ESOPHAGEAL DYSPHAGIA: Status: ACTIVE | Noted: 2024-04-15

## 2024-04-22 NOTE — PRE-PROCEDURE INSTRUCTIONS
PAT phone history completed with patient for upcoming procedure on 4/23/24 with Dr. Bear.    PAT PASS reviewed with patient and they verbalize understanding of the following:     Do not eat or drink anything after midnight the night before procedure unless otherwise instructed by physician/surgeon's office, this includes no gum, candy, mints, tobacco products or e-cigarettes.  Do not shave the area to be operated on at least 48 hours prior to procedure.  Do not wear makeup, lotion, hair products, or nail polish.  Do not wear any jewelry and remove all piercings.  Do not wear any adhesive if you wear dentures.  Do not wear contacts; bring in glasses if needed.  Only take medications on the morning of procedure as instructed by PAT nurse per anesthesia guidelines or as instructed by physician's office.  If you are on any blood thinners reach out to the physician/surgeon's office for instructions on when/if they will need to be stopped prior to procedure.  Bring in picture ID and insurance card, advanced directive copies if applicable, CPAP/BIPAP/Inhalers if indicated morning of procedure, leave any other valuables at home.  Ensure you have arranged for someone to drive you home the day of your procedure and someone to care for you at home afterwards. It is recommended that you do not drive, drink alcohol, or make any major legal decisions for at least 24 hours after your procedure is complete.    Instructions given on hospital entrance and registration location.

## 2024-04-23 ENCOUNTER — HOSPITAL ENCOUNTER (OUTPATIENT)
Facility: HOSPITAL | Age: 74
Setting detail: HOSPITAL OUTPATIENT SURGERY
Discharge: HOME | End: 2024-04-23
Attending: SURGERY | Admitting: SURGERY
Payer: MEDICARE

## 2024-04-23 ENCOUNTER — ANESTHESIA (OUTPATIENT)
Dept: GASTROENTEROLOGY | Facility: HOSPITAL | Age: 74
End: 2024-04-23
Payer: MEDICARE

## 2024-04-23 ENCOUNTER — ANESTHESIA EVENT (OUTPATIENT)
Dept: GASTROENTEROLOGY | Facility: HOSPITAL | Age: 74
End: 2024-04-23
Payer: MEDICARE

## 2024-04-23 VITALS
RESPIRATION RATE: 16 BRPM | HEART RATE: 68 BPM | BODY MASS INDEX: 27.62 KG/M2 | WEIGHT: 176 LBS | DIASTOLIC BLOOD PRESSURE: 75 MMHG | SYSTOLIC BLOOD PRESSURE: 135 MMHG | HEIGHT: 67 IN | TEMPERATURE: 97.5 F | OXYGEN SATURATION: 99 %

## 2024-04-23 DIAGNOSIS — R13.19 ESOPHAGEAL DYSPHAGIA: ICD-10-CM

## 2024-04-23 DIAGNOSIS — Z12.11 ENCOUNTER FOR COLONOSCOPY DUE TO HISTORY OF ADENOMATOUS COLONIC POLYPS: ICD-10-CM

## 2024-04-23 DIAGNOSIS — Z86.010 ENCOUNTER FOR COLONOSCOPY DUE TO HISTORY OF ADENOMATOUS COLONIC POLYPS: ICD-10-CM

## 2024-04-23 PROCEDURE — 43249 ESOPH EGD DILATION <30 MM: CPT | Performed by: SURGERY

## 2024-04-23 PROCEDURE — 25010000002 PROPOFOL 200 MG/20ML EMULSION: Performed by: NURSE ANESTHETIST, CERTIFIED REGISTERED

## 2024-04-23 PROCEDURE — C1726 CATH, BAL DIL, NON-VASCULAR: HCPCS | Performed by: SURGERY

## 2024-04-23 PROCEDURE — 45385 COLONOSCOPY W/LESION REMOVAL: CPT | Performed by: SURGERY

## 2024-04-23 PROCEDURE — 25810000003 LACTATED RINGERS PER 1000 ML: Performed by: SURGERY

## 2024-04-23 RX ORDER — PROPOFOL 10 MG/ML
INJECTION, EMULSION INTRAVENOUS AS NEEDED
Status: DISCONTINUED | OUTPATIENT
Start: 2024-04-23 | End: 2024-04-23 | Stop reason: SURG

## 2024-04-23 RX ORDER — LIDOCAINE HCL/PF 100 MG/5ML
SYRINGE (ML) INJECTION AS NEEDED
Status: DISCONTINUED | OUTPATIENT
Start: 2024-04-23 | End: 2024-04-23 | Stop reason: SURG

## 2024-04-23 RX ORDER — ONDANSETRON 2 MG/ML
4 INJECTION INTRAMUSCULAR; INTRAVENOUS ONCE AS NEEDED
Status: DISCONTINUED | OUTPATIENT
Start: 2024-04-23 | End: 2024-04-23 | Stop reason: HOSPADM

## 2024-04-23 RX ORDER — SODIUM CHLORIDE, SODIUM LACTATE, POTASSIUM CHLORIDE, CALCIUM CHLORIDE 600; 310; 30; 20 MG/100ML; MG/100ML; MG/100ML; MG/100ML
1000 INJECTION, SOLUTION INTRAVENOUS CONTINUOUS
Status: DISCONTINUED | OUTPATIENT
Start: 2024-04-23 | End: 2024-04-23 | Stop reason: HOSPADM

## 2024-04-23 RX ADMIN — PROPOFOL 50 MG: 10 INJECTION, EMULSION INTRAVENOUS at 13:07

## 2024-04-23 RX ADMIN — Medication 75 MG: at 12:45

## 2024-04-23 RX ADMIN — PROPOFOL 100 MG: 10 INJECTION, EMULSION INTRAVENOUS at 12:45

## 2024-04-23 RX ADMIN — SODIUM CHLORIDE, POTASSIUM CHLORIDE, SODIUM LACTATE AND CALCIUM CHLORIDE 1000 ML: 600; 310; 30; 20 INJECTION, SOLUTION INTRAVENOUS at 11:20

## 2024-04-23 RX ADMIN — PROPOFOL 50 MG: 10 INJECTION, EMULSION INTRAVENOUS at 12:52

## 2024-04-23 RX ADMIN — PROPOFOL 50 MG: 10 INJECTION, EMULSION INTRAVENOUS at 13:00

## 2024-04-23 NOTE — ANESTHESIA POSTPROCEDURE EVALUATION
Patient: Deon Robins    Procedure Summary       Date: 04/23/24 Room / Location: [facility] ENDOSCOPY 3 / [facility] ENDOSCOPY    Anesthesia Start: 1245 Anesthesia Stop: 1331    Procedures:       COLONOSCOPY WITH POLYPECTOMY (Anus)      ESOPHAGOGASTRODUODENOSCOPY with biopsy and dilatation (Esophagus) Diagnosis:       Esophageal dysphagia      Encounter for colonoscopy due to history of adenomatous colonic polyps      (Esophageal dysphagia [R13.19])      (Encounter for colonoscopy due to history of adenomatous colonic polyps [Z12.11, Z86.010])    Surgeons: Zev Bear MD Provider: Rich Beckett CRNA    Anesthesia Type: MAC ASA Status: 3            Anesthesia Type: MAC    Vitals  No vitals data found for the desired time range.          Post Anesthesia Care and Evaluation    Patient location during evaluation: bedside  Patient participation: complete - patient participated  Level of consciousness: awake and alert  Pain score: 0  Pain management: adequate    Airway patency: patent  Anesthetic complications: No anesthetic complications  PONV Status: none  Cardiovascular status: acceptable  Respiratory status: acceptable  Hydration status: acceptable

## 2024-04-23 NOTE — ADDENDUM NOTE
Addendum  created 04/23/24 1347 by Rich Beckett, CRNA    Review and Sign - Ready for Procedure

## 2024-04-23 NOTE — ANESTHESIA PREPROCEDURE EVALUATION
Anesthesia Evaluation     Patient summary reviewed and Nursing notes reviewed   no history of anesthetic complications:   NPO Solid Status: > 8 hours  NPO Liquid Status: > 8 hours           Airway   Mallampati: II  TM distance: >3 FB  Neck ROM: full  Possible difficult intubation and No difficulty expected  Dental      Pulmonary    (+) ,sleep apnea, decreased breath sounds  (-) not a smoker  Cardiovascular     PT is on anticoagulation therapy    (+) hypertension, valvular problems/murmurs MR, dysrhythmias Atrial Fib, hyperlipidemia      Neuro/Psych  GI/Hepatic/Renal/Endo    (+) obesity    Musculoskeletal     Abdominal    Substance History   (+) alcohol use     OB/GYN          Other   arthritis,     ROS/Med Hx Other: Labs reviewed   EKG sb lad   Echo 2021 · Estimated left ventricular EF = 50% Left ventricular systolic function is low normal  · Left ventricular diastolic function is consistent with (grade I) impaired relaxation.  · The right ventricular cavity is mild to moderately dilated. Normal RV systolic function  · Mild mitral annular calcification is present. Mild mitral valve regurgitation is prese                  Anesthesia Plan    ASA 3     MAC     (Risks and benefits discussed including risk of aspiration, recall and dental damage. All patient questions answered.    Patient told that either a breathing mask or a breathing tube will be used to manage the airway.    Will continue with plan of care.)  intravenous induction     Anesthetic plan, risks, benefits, and alternatives have been provided, discussed and informed consent has been obtained with: patient.  Pre-procedure education provided    CODE STATUS:

## 2024-04-25 LAB — REF LAB TEST METHOD: NORMAL

## 2024-05-02 RX ORDER — SOTALOL HYDROCHLORIDE 120 MG/1
120 TABLET ORAL 2 TIMES DAILY
Qty: 180 TABLET | Refills: 1 | OUTPATIENT
Start: 2024-05-02

## 2024-05-06 ENCOUNTER — OFFICE VISIT (OUTPATIENT)
Dept: SURGERY | Facility: CLINIC | Age: 74
End: 2024-05-06
Payer: MEDICARE

## 2024-05-06 VITALS
BODY MASS INDEX: 27.94 KG/M2 | HEIGHT: 67 IN | DIASTOLIC BLOOD PRESSURE: 69 MMHG | SYSTOLIC BLOOD PRESSURE: 131 MMHG | OXYGEN SATURATION: 96 % | WEIGHT: 178 LBS | HEART RATE: 67 BPM

## 2024-05-06 DIAGNOSIS — Z86.010 HX OF ADENOMATOUS COLONIC POLYPS: Primary | ICD-10-CM

## 2024-05-06 DIAGNOSIS — R13.19 ESOPHAGEAL DYSPHAGIA: ICD-10-CM

## 2024-05-06 PROCEDURE — 99213 OFFICE O/P EST LOW 20 MIN: CPT | Performed by: SURGERY

## 2024-05-06 PROCEDURE — 1160F RVW MEDS BY RX/DR IN RCRD: CPT | Performed by: SURGERY

## 2024-05-06 PROCEDURE — 3075F SYST BP GE 130 - 139MM HG: CPT | Performed by: SURGERY

## 2024-05-06 PROCEDURE — 3078F DIAST BP <80 MM HG: CPT | Performed by: SURGERY

## 2024-05-06 PROCEDURE — 1159F MED LIST DOCD IN RCRD: CPT | Performed by: SURGERY

## 2024-05-06 RX ORDER — SOTALOL HYDROCHLORIDE 120 MG/1
1 TABLET ORAL EVERY 12 HOURS SCHEDULED
COMMUNITY
Start: 2024-02-12

## 2024-05-14 ENCOUNTER — OFFICE VISIT (OUTPATIENT)
Dept: CARDIOLOGY | Facility: CLINIC | Age: 74
End: 2024-05-14
Payer: MEDICARE

## 2024-05-14 VITALS
SYSTOLIC BLOOD PRESSURE: 134 MMHG | HEART RATE: 66 BPM | WEIGHT: 176.8 LBS | BODY MASS INDEX: 27.75 KG/M2 | HEIGHT: 67 IN | DIASTOLIC BLOOD PRESSURE: 76 MMHG | OXYGEN SATURATION: 98 %

## 2024-05-14 DIAGNOSIS — G47.30 SLEEP APNEA, UNSPECIFIED TYPE: Primary | ICD-10-CM

## 2024-05-14 NOTE — PROGRESS NOTES
Cardiac Electrophysiology Outpatient Note  [facility] at [facility]    Office Visit     Deon Robins  [id number]    Primary Care Physician: Nish Rodriguez MD    Subjective     Chief Complaint   Patient presents with    Paroxysmal atrial fibrillation     Problem List:  Paroxysmal atrial fibrillation  Diagnosis 2006  ECV to sinus 6/2021, 1/2023, and 8/2023  Echo 6/2021: LVEF 50%, grade I LV DD, mild MR, mild to mod RV dilation  Breakthrough atrial fibrillation on sotalol 120 mg bid  Abnormal EKG/atypical chest pain  Chronically abnormal with inferior and anterior Q-waves  LHC 2006 remote normal, idb  Normal stress 2015, idb  Pending stress 11/23  Hypertension  Hyperlipidemia  ALE  Not on CPAP. Pending sleep med referral (12/2023)        History of Present Illness:   Deon Robins is a 74 y.o. female who presents to clinic today for follow-up regarding atrial fibrillation status post successful PVA, CTI flutter ablation.  She reports clinically since the procedure she had no recurrent events.  She is maintaining sinus rhythm off of  she does remain on antiarrhythmic medication.  She also is on Xarelto for anticoagulation.  She has no chest pain chest tightness dizziness near syncope or syncope.        Past Medical History:   Diagnosis Date    Arthritis     Atrial fibrillation     Chest pain     States that she has discussed with her cardiologist (states that he believes that this is R/T a-fib) States off and off x 6 months or more.    Chicken pox     Edema of left upper arm 11/28/2023    Elevated cholesterol     Hyperlipidemia     Hypertension     Measles     Problems with swallowing     food    Sleep apnea     no CPAP       Past Surgical History:   Procedure Laterality Date    CARDIAC CATHETERIZATION  2006    CARDIAC ELECTROPHYSIOLOGY PROCEDURE N/A 02/07/2024    Procedure: Ablation atrial fibrillation; hold sotalol for 5 days;  Surgeon: Paul Corbin MD;  Location: [facility]  INVASIVE LOCATION;  Service: Cardiovascular;  Laterality: N/A;    CARDIOVERSION  2023    COLONOSCOPY      COLONOSCOPY N/A 4/23/2024    Procedure: COLONOSCOPY WITH POLYPECTOMY;  Surgeon: Zev Bear MD;  Location: [facility] ENDOSCOPY;  Service: Gastroenterology;  Laterality: N/A;    ENDOSCOPY N/A 4/23/2024    Procedure: ESOPHAGOGASTRODUODENOSCOPY with biopsy and dilatation;  Surgeon: Zev Bear MD;  Location: [facility] ENDOSCOPY;  Service: Gastroenterology;  Laterality: N/A;    HYSTERECTOMY      TOTAL KNEE ARTHROPLASTY      both knees       Family History   Problem Relation Age of Onset    Diabetes Mother     Cancer Sister     Cancer Brother        Social History     Socioeconomic History    Marital status:    Tobacco Use    Smoking status: Never     Passive exposure: Current    Smokeless tobacco: Never   Vaping Use    Vaping status: Never Used   Substance and Sexual Activity    Alcohol use: Not Currently    Drug use: Never    Sexual activity: Defer         Current Outpatient Medications:     amLODIPine (NORVASC) 5 MG tablet, TAKE ONE TABLET BY MOUTH ONCE DAILY, Disp: 90 tablet, Rfl: 3    calcium carbonate (OS-SUGEY) 600 MG tablet, Take 1 tablet by mouth 2 (Two) Times a Day. With vit D, Disp: , Rfl:     losartan (COZAAR) 25 MG tablet, TAKE ONE TABLET BY MOUTH TWICE DAILY, Disp: 180 tablet, Rfl: 3    pravastatin (PRAVACHOL) 20 MG tablet, Take 2 tablets by mouth every night at bedtime., Disp: , Rfl:     rivaroxaban (Xarelto) 20 MG tablet, Take 1 tablet by mouth Daily., Disp: 90 tablet, Rfl: 3    sotalol (BETAPACE) 120 MG tablet, Take 1 tablet by mouth Every 12 (Twelve) Hours. (Patient not taking: Reported on 5/6/2024), Disp: , Rfl:     Allergies:   Allergies   Allergen Reactions    Niaspan [Niacin] Other (See Comments)     FLUSHING, WEAKNESS, CYANOSIS    Penicillins Shortness Of Breath and Rash     Beta lactam allergy details  Antibiotic reaction: (!) rash, shortness of breath  Age at reaction:  "adult  Dose to reaction time: unknown  Reason for antibiotic: unknown  Epinephrine required for reaction?: unknown          Objective   Vital Signs: Blood pressure 134/76, pulse 66, height 170.2 cm (67.01\"), weight 80.2 kg (176 lb 12.8 oz), SpO2 98%.    PHYSICAL EXAM  General appearance: Awake, alert, cooperative  Lungs: Clear to ascultation bilaterally  Heart: Regular rate and rhythm, no murmurs, 2+ LE pulses, no lower extremity swelling, distal RUE 2+ edema 2 inches above wrist and distal  Skin: Skin color, turgor normal, no rashes or lesions  Neurologic: Grossly normal     Lab Results   Component Value Date    GLUCOSE 101 (H) 02/01/2024    CALCIUM 10.1 02/01/2024     02/01/2024    K 3.7 02/01/2024    CO2 27.0 02/01/2024     02/01/2024    BUN 7 (L) 02/01/2024    CREATININE 0.50 (L) 02/07/2024    EGFRIFNONA 83 06/21/2021    BCR 9.7 02/01/2024    ANIONGAP 10.0 02/01/2024     Lab Results   Component Value Date    WBC 9.23 02/01/2024    HGB 12.9 02/07/2024    HCT 38 02/07/2024    MCV 91.5 02/01/2024     02/01/2024     No results found for: \"INR\", \"PROTIME\"  No results found for: \"TSH\", \"X8WBCDG\", \"V7KIMRF\", \"THYROIDAB\"       Results for orders placed during the hospital encounter of 06/21/21    Adult Transthoracic Echo Complete W/ Cont if Necessary Per Protocol    Interpretation Summary  · Estimated left ventricular EF = 50% Left ventricular systolic function is low normal  · Left ventricular diastolic function is consistent with (grade I) impaired relaxation.  · The right ventricular cavity is mild to moderately dilated. Normal RV systolic function  · Mild mitral annular calcification is present. Mild mitral valve regurgitation is present         I personally viewed and interpreted the patient's EKG/Telemetry/lab data      ECG 12 Lead    Date/Time: 5/14/2024 10:32 AM  Performed by: Mauro Douglass PA    Authorized by: Mauro Douglass PA  Comparison: compared with previous ECG from " 12/5/2024  Similar to previous ECG  Rhythm: sinus rhythm  Rate: normal  Conduction: conduction normal  ST Segments: ST segments normal  T Waves: T waves normal  QRS axis: normal    Clinical impression: normal ECG        12/5/2023  Deon Robins  reports that she has never smoked. She has been exposed to tobacco smoke. She has never used smokeless tobacco..        Advance Care Planning   Advance Care Planning: ACP discussion was declined by the patient. Patient does not have an advance directive, information provided.     Assessment & Plan    1. Paroxysmal atrial fibrillation  Diagnosis ~2006. Continue Xarelto for stroke prophylaxis.  Previously treated with sotalol therapy.  Previous cardioversion procedures in the past.  She is now status post successful PVA as well as RFA posterior wall and RFA of CTI dependent atrial flutter.  She states since the procedure she has had no recurrent arrhythmias.      2. Essential Hypertension  BP controlled in office today. Continue current antihypertensive regimen.    3. ALE  Needs sleep study, CPAP      Electronically signed by SARAH Downing, 05/14/24, 10:32 AM EDT.

## 2024-06-24 RX ORDER — PRAVASTATIN SODIUM 20 MG
40 TABLET ORAL
Qty: 90 TABLET | Refills: 3 | Status: SHIPPED | OUTPATIENT
Start: 2024-06-24

## 2024-08-12 RX ORDER — LOSARTAN POTASSIUM 25 MG/1
TABLET ORAL
Qty: 180 TABLET | Refills: 3 | Status: SHIPPED | OUTPATIENT
Start: 2024-08-12

## 2024-09-05 ENCOUNTER — OFFICE VISIT (OUTPATIENT)
Dept: CARDIOLOGY | Facility: CLINIC | Age: 74
End: 2024-09-05
Payer: MEDICARE

## 2024-09-05 VITALS
DIASTOLIC BLOOD PRESSURE: 70 MMHG | SYSTOLIC BLOOD PRESSURE: 132 MMHG | BODY MASS INDEX: 27.18 KG/M2 | WEIGHT: 173.2 LBS | HEART RATE: 59 BPM | HEIGHT: 67 IN | OXYGEN SATURATION: 98 %

## 2024-09-05 DIAGNOSIS — I10 ESSENTIAL (PRIMARY) HYPERTENSION: ICD-10-CM

## 2024-09-05 DIAGNOSIS — E78.5 HYPERLIPIDEMIA, UNSPECIFIED HYPERLIPIDEMIA TYPE: ICD-10-CM

## 2024-09-05 DIAGNOSIS — I48.0 PAROXYSMAL ATRIAL FIBRILLATION: Primary | ICD-10-CM

## 2024-09-05 PROCEDURE — 99214 OFFICE O/P EST MOD 30 MIN: CPT | Performed by: INTERNAL MEDICINE

## 2024-09-05 PROCEDURE — 3075F SYST BP GE 130 - 139MM HG: CPT | Performed by: INTERNAL MEDICINE

## 2024-09-05 PROCEDURE — 3078F DIAST BP <80 MM HG: CPT | Performed by: INTERNAL MEDICINE

## 2024-09-05 RX ORDER — AMLODIPINE BESYLATE 5 MG/1
5 TABLET ORAL DAILY
Qty: 90 TABLET | Refills: 3 | Status: SHIPPED | OUTPATIENT
Start: 2024-09-05

## 2024-09-05 RX ORDER — METOPROLOL TARTRATE 25 MG/1
25 TABLET, FILM COATED ORAL DAILY PRN
Qty: 30 TABLET | Refills: 6 | Status: SHIPPED | OUTPATIENT
Start: 2024-09-05

## 2024-09-05 NOTE — PROGRESS NOTES
[facility]  Follow Up Visit  Deon Robins  1950    VISIT DATE:  09/05/24    PCP:   Nish Rodriguez MD  [address] [place] [address]          CC:  Paroxysmal atrial fibrillation      Problem List:  Paroxysmal atrial fibrillation  Diagnosis 2006  ECV to sinus 6/2021, 1/2023, and 8/2023  Echo 6/2021: LVEF 50%, grade I LV DD, mild MR, mild to mod RV dilation  Breakthrough atrial fibrillation on sotalol 120 mg bid  PVI and flutter ablation February 2024  Abnormal EKG/atypical chest pain  Chronically abnormal with inferior and anterior Q-waves  LHC 2006 remote normal, idb  Normal stress 2015, idb  Pending stress 11/23  Hypertension  Hyperlipidemia  ALE  Not on CPAP. Pending sleep med referral (12/2023)    History of Present Illness:  Deon Robins  Is a 74 y.o. female with pertinent cardiac history detailed above.  Patient is now status post ablation for atrial fibrillation flutter.  She states she will have an occasional flutter or palpitation.  Nothing that has lasted longer than 30 minutes.  She is off of sotalol but continues on Xarelto.  Blood pressure is controlled.  No other complaints      Patient Active Problem List    Diagnosis Date Noted    Esophageal dysphagia 04/15/2024    Encounter for colonoscopy due to history of adenomatous colonic polyps 04/15/2024    Chronic anticoagulation 07/22/2021    High risk medication use 07/22/2021    Sinus bradycardia 07/22/2021    Mild mitral regurgitation 07/11/2019    Essential (primary) hypertension 09/27/2013    Paroxysmal atrial fibrillation 09/27/2013       Allergies   Allergen Reactions    Niaspan [Niacin] Other (See Comments)     FLUSHING, WEAKNESS, CYANOSIS    Penicillins Shortness Of Breath and Rash     Beta lactam allergy details  Antibiotic reaction: (!) rash, shortness of breath  Age at reaction: adult  Dose to reaction time: unknown  Reason for antibiotic: unknown  Epinephrine required for reaction?: unknown     "      Social History     Socioeconomic History    Marital status:    Tobacco Use    Smoking status: Never     Passive exposure: Current    Smokeless tobacco: Never   Vaping Use    Vaping status: Never Used   Substance and Sexual Activity    Alcohol use: Not Currently    Drug use: Never    Sexual activity: Defer       Family History   Problem Relation Age of Onset    Diabetes Mother     Cancer Sister     Cancer Brother        Current Medications:    Current Outpatient Medications:     amLODIPine (NORVASC) 5 MG tablet, TAKE ONE TABLET BY MOUTH ONCE DAILY, Disp: 90 tablet, Rfl: 3    calcium carbonate (OS-SUGEY) 600 MG tablet, Take 1 tablet by mouth 2 (Two) Times a Day. With vit D, Disp: , Rfl:     losartan (COZAAR) 25 MG tablet, TAKE ONE TABLET BY MOUTH TWICE DAILY, Disp: 180 tablet, Rfl: 3    pravastatin (PRAVACHOL) 20 MG tablet, TAKE TWO TABLETS AT BEDTIME, Disp: 90 tablet, Rfl: 3    rivaroxaban (Xarelto) 20 MG tablet, Take 1 tablet by mouth Daily., Disp: 90 tablet, Rfl: 3    sotalol (BETAPACE) 120 MG tablet, Take 1 tablet by mouth Every 12 (Twelve) Hours., Disp: , Rfl:      Review of Systems   Cardiovascular:  Positive for palpitations. Negative for chest pain.   Respiratory:  Negative for shortness of breath.        Vitals:    09/05/24 1013   BP: 132/70   BP Location: Left arm   Patient Position: Sitting   Pulse: 59   SpO2: 98%   Weight: 78.6 kg (173 lb 3.2 oz)   Height: 170.2 cm (67\")       Physical Exam  Constitutional:       Appearance: Normal appearance.   Neck:      Vascular: No carotid bruit.   Cardiovascular:      Rate and Rhythm: Normal rate and regular rhythm.      Pulses: Normal pulses.      Heart sounds: Normal heart sounds.   Pulmonary:      Effort: Pulmonary effort is normal.      Breath sounds: Normal breath sounds.   Musculoskeletal:      Right lower leg: No edema.      Left lower leg: No edema.   Neurological:      Mental Status: She is alert.         Diagnostic Data:  Procedures  No results " "found for: \"CHLPL\", \"TRIG\", \"HDL\", \"LDLDIRECT\"  Lab Results   Component Value Date    GLUCOSE 101 (H) 02/01/2024    BUN 7 (L) 02/01/2024    CREATININE 0.50 (L) 02/07/2024     02/01/2024    K 3.7 02/01/2024     02/01/2024    CO2 27.0 02/01/2024     No results found for: \"HGBA1C\"  Lab Results   Component Value Date    WBC 9.23 02/01/2024    HGB 12.9 02/07/2024    HCT 38 02/07/2024     02/01/2024       Assessment:  No diagnosis found.    Plan:    1.  Atrial fibrillation  -PVI and flutter ablation February 2024  -off of sotalol post ablation  -Continue Xarelto anticoagulation  -Echo EF 50%.  There was RV dilation  -Will give her metoprolol 25 mg as needed to take if she has an episode of A-fib     2.  Hypertension  -on amlodipine, losartan,   -Controlled no changes made     3.  HLD  -Pravastatin 40 mg.  Recommend repeat lipids at next visit     5.  Chronically abnormal EKG and atypical chest pain  -Normal stress test December 2023 no ischemia         ACP discussion was held with the patient during this visit. Patient has an advance directive in EMR which is still valid.       Timbo Munguia MD FAC     "

## 2024-10-15 RX ORDER — RIVAROXABAN 20 MG/1
20 TABLET, FILM COATED ORAL DAILY
Qty: 90 TABLET | Refills: 3 | Status: SHIPPED | OUTPATIENT
Start: 2024-10-15

## 2024-11-26 RX ORDER — PRAVASTATIN SODIUM 20 MG
40 TABLET ORAL
Qty: 90 TABLET | Refills: 3 | Status: SHIPPED | OUTPATIENT
Start: 2024-11-26

## 2025-02-18 ENCOUNTER — OFFICE VISIT (OUTPATIENT)
Dept: CARDIOLOGY | Facility: CLINIC | Age: 75
End: 2025-02-18
Payer: MEDICARE

## 2025-02-18 VITALS
BODY MASS INDEX: 27.25 KG/M2 | HEART RATE: 89 BPM | OXYGEN SATURATION: 96 % | DIASTOLIC BLOOD PRESSURE: 80 MMHG | HEIGHT: 67 IN | WEIGHT: 173.6 LBS | SYSTOLIC BLOOD PRESSURE: 138 MMHG

## 2025-02-18 DIAGNOSIS — R00.1 SINUS BRADYCARDIA: ICD-10-CM

## 2025-02-18 DIAGNOSIS — I48.0 PAROXYSMAL ATRIAL FIBRILLATION: Primary | ICD-10-CM

## 2025-02-18 PROCEDURE — 99214 OFFICE O/P EST MOD 30 MIN: CPT | Performed by: PHYSICIAN ASSISTANT

## 2025-02-18 PROCEDURE — G2211 COMPLEX E/M VISIT ADD ON: HCPCS | Performed by: PHYSICIAN ASSISTANT

## 2025-02-18 PROCEDURE — 3079F DIAST BP 80-89 MM HG: CPT | Performed by: PHYSICIAN ASSISTANT

## 2025-02-18 PROCEDURE — 3075F SYST BP GE 130 - 139MM HG: CPT | Performed by: PHYSICIAN ASSISTANT

## 2025-02-18 NOTE — PROGRESS NOTES
Cardiac Electrophysiology Outpatient Note  [facility] at [facility]    Office Visit     Deon Robins  [id number]    Primary Care Physician: Char Bullock APRN    Subjective     Chief Complaint   Patient presents with    Sleep apnea, unspecified type     9 mo     Problem List:  Paroxysmal atrial fibrillation  Diagnosis 2006  ECV to sinus 6/2021, 1/2023, and 8/2023  Echo 6/2021: LVEF 50%, grade I LV DD, mild MR, mild to mod RV dilation  Breakthrough atrial fibrillation on sotalol 120 mg bid  PVA 2/7/2024. Sotalol discontinued.   Chadsvasc=3, Xarelto.   Abnormal EKG/atypical chest pain  Chronically abnormal with inferior and anterior Q-waves  LHC 2006 remote normal, idb  Normal stress 2015, idb  Pending stress 11/23  Hypertension  Hyperlipidemia  ALE  Not on CPAP. Pending sleep med referral (12/2023)        History of Present Illness:   Deon Robins is a 74 y.o. female who presents to clinic today for follow-up regarding atrial fibrillation status post  PVA, CTI flutter ablation.  She was last seen by our office 9/5/2024. Since we last saw the pt,she has had some increase in palpitations recently. She thinks its similar  to Afib in the past not as long.  She denies any chest pain, dizziness, near syncope or syncope.  Overall she is quite happy results of the ablation procedure.  She has no complaints today.    Past Medical History:   Diagnosis Date    Arthritis     Atrial fibrillation     Chest pain     States that she has discussed with her cardiologist (states that he believes that this is R/T a-fib) States off and off x 6 months or more.    Chicken pox     Edema of left upper arm 11/28/2023    Elevated cholesterol     Hyperlipidemia     Hypertension     Measles     Problems with swallowing     food    Sleep apnea     no CPAP       Past Surgical History:   Procedure Laterality Date    CARDIAC CATHETERIZATION  2006    CARDIAC ELECTROPHYSIOLOGY PROCEDURE N/A 02/07/2024     Procedure: Ablation atrial fibrillation; hold sotalol for 5 days;  Surgeon: Paul Corbin MD;  Location:  [facility] INVASIVE LOCATION;  Service: Cardiovascular;  Laterality: N/A;    CARDIOVERSION  2023    COLONOSCOPY      COLONOSCOPY N/A 4/23/2024    Procedure: COLONOSCOPY WITH POLYPECTOMY;  Surgeon: Zev Bear MD;  Location: [facility] ENDOSCOPY;  Service: Gastroenterology;  Laterality: N/A;    ENDOSCOPY N/A 4/23/2024    Procedure: ESOPHAGOGASTRODUODENOSCOPY with biopsy and dilatation;  Surgeon: Zev Bear MD;  Location: [facility] ENDOSCOPY;  Service: Gastroenterology;  Laterality: N/A;    HYSTERECTOMY      TOTAL KNEE ARTHROPLASTY      both knees       Family History   Problem Relation Age of Onset    Diabetes Mother     Cancer Sister     Cancer Brother        Social History     Socioeconomic History    Marital status:    Tobacco Use    Smoking status: Never     Passive exposure: Current    Smokeless tobacco: Never   Vaping Use    Vaping status: Never Used   Substance and Sexual Activity    Alcohol use: Not Currently    Drug use: Never    Sexual activity: Defer         Current Outpatient Medications:     amLODIPine (NORVASC) 5 MG tablet, Take 1 tablet by mouth Daily., Disp: 90 tablet, Rfl: 3    calcium carbonate (OS-SUGEY) 600 MG tablet, Take 1 tablet by mouth 2 (Two) Times a Day. With vit D, Disp: , Rfl:     losartan (COZAAR) 25 MG tablet, TAKE ONE TABLET BY MOUTH TWICE DAILY, Disp: 180 tablet, Rfl: 3    metoprolol tartrate (LOPRESSOR) 25 MG tablet, Take 1 tablet by mouth Daily As Needed (afib)., Disp: 30 tablet, Rfl: 6    pravastatin (PRAVACHOL) 20 MG tablet, TAKE TWO TABLETS BY MOUTH AT BEDTIME, Disp: 90 tablet, Rfl: 3    Xarelto 20 MG tablet, TAKE ONE TABLET EVERY DAY, Disp: 90 tablet, Rfl: 3    Allergies:   Allergies   Allergen Reactions    Niaspan [Niacin] Other (See Comments)     FLUSHING, WEAKNESS, CYANOSIS    Penicillins Shortness Of Breath and Rash     Beta lactam allergy details  Antibiotic  "reaction: (!) rash, shortness of breath  Age at reaction: adult  Dose to reaction time: unknown  Reason for antibiotic: unknown  Epinephrine required for reaction?: unknown          Objective   Vital Signs: Blood pressure 138/80, pulse 89, height 170.2 cm (67\"), weight 78.7 kg (173 lb 9.6 oz), SpO2 96%.    PHYSICAL EXAM  General appearance: Awake, alert, cooperative  Lungs: Clear to ascultation bilaterally  Heart: Regular rate and rhythm, no murmurs, 2+ LE pulses, no lower extremity swelling, distal RUE 2+ edema 2 inches above wrist and distal  Skin: Skin color, turgor normal, no rashes or lesions  Neurologic: Grossly normal     Lab Results   Component Value Date    GLUCOSE 101 (H) 02/01/2024    CALCIUM 10.1 02/01/2024     02/01/2024    K 3.7 02/01/2024    CO2 27.0 02/01/2024     02/01/2024    BUN 7 (L) 02/01/2024    CREATININE 0.50 (L) 02/07/2024    EGFRIFNONA 83 06/21/2021    BCR 9.7 02/01/2024    ANIONGAP 10.0 02/01/2024     Lab Results   Component Value Date    WBC 9.23 02/01/2024    HGB 12.9 02/07/2024    HCT 38 02/07/2024    MCV 91.5 02/01/2024     02/01/2024     No results found for: \"INR\", \"PROTIME\"  No results found for: \"TSH\", \"G1QJBUP\", \"P4FUUGE\", \"THYROIDAB\"       Results for orders placed during the hospital encounter of 06/21/21    Adult Transthoracic Echo Complete W/ Cont if Necessary Per Protocol    Interpretation Summary  · Estimated left ventricular EF = 50% Left ventricular systolic function is low normal  · Left ventricular diastolic function is consistent with (grade I) impaired relaxation.  · The right ventricular cavity is mild to moderately dilated. Normal RV systolic function  · Mild mitral annular calcification is present. Mild mitral valve regurgitation is present         I personally viewed and interpreted the patient's EKG/Telemetry/lab data    Procedures  12/5/2023 EKG today stable normal sinus rhythm no A-fib.  Heart rate 63 bpm QTc 442 ms.  Deon Robins  reports " that she has never smoked. She has been exposed to tobacco smoke. She has never used smokeless tobacco..        Advance Care Planning   Advance Care Planning: ACP discussion was declined by the patient. Patient does not have an advance directive, information provided.     Assessment & Plan    1. Paroxysmal atrial fibrillation  Diagnosis ~2006. Continue Xarelto for stroke prophylaxis.  Previously treated with sotalol therapy.  Previous cardioversion procedures in the past.  She is now status post successful PVA as well as RFA posterior wall and RFA of CTI dependent atrial flutter.  She has rare palpitations he is quite short nature.  Much different with a bit in the past.  She did have 1 episode 1 few days ago that lasted longer than usual but otherwise the overall burden of her episodes are quite rare..      2. Essential Hypertension  BP controlled in office today. Continue current antihypertensive regimen.    3. ALE  Pursue sleep apnea treatment.      Electronically signed by SARAH Downing, 05/14/24, 10:32 AM EDT.

## 2025-05-16 RX ORDER — PRAVASTATIN SODIUM 40 MG
40 TABLET ORAL
Qty: 30 TABLET | Refills: 0 | Status: SHIPPED | OUTPATIENT
Start: 2025-05-16

## 2025-07-08 RX ORDER — PRAVASTATIN SODIUM 40 MG
40 TABLET ORAL
Qty: 90 TABLET | Refills: 1 | Status: SHIPPED | OUTPATIENT
Start: 2025-07-08

## 2025-07-08 NOTE — TELEPHONE ENCOUNTER
Received fax refill request from [facility] for Pravastatin 40 mg daily    Last appt 9/5/24. Next appt 12/9/25. Last lipid scanned in 3/27/25.     Refilled med

## 2025-07-22 RX ORDER — LOSARTAN POTASSIUM 25 MG/1
25 TABLET ORAL EVERY 12 HOURS SCHEDULED
Qty: 180 TABLET | Refills: 1 | Status: SHIPPED | OUTPATIENT
Start: 2025-07-22

## 2025-08-19 RX ORDER — AMLODIPINE BESYLATE 5 MG/1
5 TABLET ORAL DAILY
Qty: 90 TABLET | Refills: 1 | Status: SHIPPED | OUTPATIENT
Start: 2025-08-19

## (undated) DEVICE — CVR CONN SWIFTLINK

## (undated) DEVICE — TBG PRESS MONTR  PRESS/LO M/LL FML/FIT PVC 48IN LF STRL

## (undated) DEVICE — VLV SXN AIR/H2O ORCAPOD3 1P/U STRL

## (undated) DEVICE — Device

## (undated) DEVICE — TBG CONN IRR SMARTABLATE PUMP 1P/U

## (undated) DEVICE — SINGLE-USE POLYPECTOMY SNARE: Brand: CAPTIVATOR II

## (undated) DEVICE — SHEATH GUIDE EP CARTO VIZIGO BIDIR CRV/MD22MM 71CM

## (undated) DEVICE — KT ACC TRNSEP VERSACROSS D1 45DEG/63CM PIG/180CM

## (undated) DEVICE — ST TRANSD TRUWAVE STPCK3WY 4 60 AND 12IN

## (undated) DEVICE — CONMED SCOPE SAVER BITE BLOCK, 20X27 MM: Brand: SCOPE SAVER

## (undated) DEVICE — TBG EXT STD/BORE LL 48IN

## (undated) DEVICE — QUICK CATCH IN-LINE SUCTION POLYP TRAP IS USED FOR SUCTION RETRIEVAL OF ENDOSCOPICALLY REMOVED POLYPS.

## (undated) DEVICE — SYR LUER SLPTP 50ML

## (undated) DEVICE — SHEATH INTRO PINN PERIPH .035 11F10CM

## (undated) DEVICE — SYS CLS VASC/VENI VASCADE MVP 6TO12F

## (undated) DEVICE — HYBRID CO2 TUBING/CAP SET FOR OLYMPUS® SCOPES & CO2 SOURCE: Brand: ERBE

## (undated) DEVICE — ST INF PRI SMRTSTE 20DRP 2VLV 24ML 117

## (undated) DEVICE — SOL NACL 0.9PCT 1000ML

## (undated) DEVICE — DEV INFL ALLIANCE2 SYS

## (undated) DEVICE — ELECTRD DEFIB ZOLL/CONN RADIOPRNT LG/BKPAD A/

## (undated) DEVICE — PAD GRND REM POLYHESIVE A/ DISP

## (undated) DEVICE — SUCTION CANISTER, 1500CC, RIGID: Brand: DEROYAL

## (undated) DEVICE — ELECTRD RETRN/GRND MEGADYNE SGL/PLT W/CORD 9FT DISP

## (undated) DEVICE — DRSNG SURESITE123 4X4.8IN

## (undated) DEVICE — PTCH MP CARTO3 EXT REF

## (undated) DEVICE — MEDI-VAC NON-CONDUCTIVE SUCTION TUBING: Brand: CARDINAL HEALTH

## (undated) DEVICE — SET PRIMARY GRVTY 10DP MALE LL 104IN

## (undated) DEVICE — NDL PERC 1PART ECHOTIP WO/BASEPLT 18G 7CM

## (undated) DEVICE — ST EXT IV SMARTSITE PINCH/CLMP 5ML 46CM

## (undated) DEVICE — CATH ABL QDOT/MICRO BIDIR D/F/CRV 3.5X115CM

## (undated) DEVICE — ESOPHAGEAL BALLOON DILATATION CATHETER: Brand: CRE FIXED WIRE

## (undated) DEVICE — DECANT BG O JET

## (undated) DEVICE — ENDOSCOPY PORT CONNECTOR FOR OLYMPUS® SCOPES: Brand: ERBE

## (undated) DEVICE — SHEATH INTRO PINN PERIPH .035 9F10CM

## (undated) DEVICE — SHEATH INTRO PINN CORNRY .038 8F10CM

## (undated) DEVICE — ST EXT IV SMRTSTE 2VLV FIX M LL 6ML 41

## (undated) DEVICE — PK EP 10

## (undated) DEVICE — TBG SXN ESOPH ENSOETM FOR/BLANETROL/1/2

## (undated) DEVICE — LUBE JELLY PK/2.75GM STRL BX/144